# Patient Record
Sex: FEMALE | Race: WHITE
[De-identification: names, ages, dates, MRNs, and addresses within clinical notes are randomized per-mention and may not be internally consistent; named-entity substitution may affect disease eponyms.]

---

## 2018-10-30 ENCOUNTER — HOSPITAL ENCOUNTER (INPATIENT)
Dept: HOSPITAL 74 - JER | Age: 28
LOS: 7 days | Discharge: HOME HEALTH | DRG: 49 | End: 2018-11-06
Attending: FAMILY MEDICINE | Admitting: INTERNAL MEDICINE
Payer: COMMERCIAL

## 2018-10-30 VITALS — BODY MASS INDEX: 26.4 KG/M2

## 2018-10-30 DIAGNOSIS — R29.898: ICD-10-CM

## 2018-10-30 DIAGNOSIS — G37.3: ICD-10-CM

## 2018-10-30 DIAGNOSIS — M54.9: ICD-10-CM

## 2018-10-30 DIAGNOSIS — G61.0: Primary | ICD-10-CM

## 2018-10-30 DIAGNOSIS — R27.0: ICD-10-CM

## 2018-10-30 LAB
ALBUMIN SERPL-MCNC: 4.6 G/DL (ref 3.4–5)
ALP SERPL-CCNC: 66 U/L (ref 45–117)
ALT SERPL-CCNC: 23 U/L (ref 13–61)
ANION GAP SERPL CALC-SCNC: 8 MMOL/L (ref 8–16)
APPEARANCE UR: CLEAR
AST SERPL-CCNC: 12 U/L (ref 15–37)
BASOPHILS # BLD: 0.6 % (ref 0–2)
BILIRUB SERPL-MCNC: 0.2 MG/DL (ref 0.2–1)
BILIRUB UR STRIP.AUTO-MCNC: NEGATIVE MG/DL
BUN SERPL-MCNC: 14 MG/DL (ref 7–18)
CALCIUM SERPL-MCNC: 9.1 MG/DL (ref 8.5–10.1)
CHLORIDE SERPL-SCNC: 106 MMOL/L (ref 98–107)
CO2 SERPL-SCNC: 24 MMOL/L (ref 21–32)
COLOR UR: (no result)
CREAT SERPL-MCNC: 0.5 MG/DL (ref 0.55–1.3)
DEPRECATED RDW RBC AUTO: 15.2 % (ref 11.6–15.6)
EOSINOPHIL # BLD: 2.6 % (ref 0–4.5)
EPITH CASTS URNS QL MICRO: (no result) /HPF
GLUCOSE SERPL-MCNC: 82 MG/DL (ref 74–106)
HCT VFR BLD CALC: 38.3 % (ref 32.4–45.2)
HGB BLD-MCNC: 12.3 GM/DL (ref 10.7–15.3)
INR BLD: 1 (ref 0.83–1.09)
KETONES UR QL STRIP: (no result)
LEUKOCYTE ESTERASE UR QL STRIP.AUTO: (no result)
LYMPHOCYTES # BLD: 30.1 % (ref 8–40)
MCH RBC QN AUTO: 23 PG (ref 25.7–33.7)
MCHC RBC AUTO-ENTMCNC: 32.2 G/DL (ref 32–36)
MCV RBC: 71.4 FL (ref 80–96)
MONOCYTES # BLD AUTO: 5.1 % (ref 3.8–10.2)
MUCOUS THREADS URNS QL MICRO: (no result)
NEUTROPHILS # BLD: 61.6 % (ref 42.8–82.8)
NITRITE UR QL STRIP: NEGATIVE
PH UR: 5 [PH] (ref 5–8)
PLATELET # BLD AUTO: 312 K/MM3 (ref 134–434)
PMV BLD: 8.3 FL (ref 7.5–11.1)
POTASSIUM SERPLBLD-SCNC: 4.2 MMOL/L (ref 3.5–5.1)
PROT SERPL-MCNC: 8.2 G/DL (ref 6.4–8.2)
PROT UR QL STRIP: NEGATIVE
PROT UR QL STRIP: NEGATIVE
PT PNL PPP: 11.8 SEC (ref 9.7–13)
RBC # BLD AUTO: 5.36 M/MM3 (ref 3.6–5.2)
SODIUM SERPL-SCNC: 137 MMOL/L (ref 136–145)
SP GR UR: 1.02 (ref 1.01–1.03)
UROBILINOGEN UR STRIP-MCNC: NEGATIVE MG/DL (ref 0.2–1)
WBC # BLD AUTO: 6.9 K/MM3 (ref 4–10)

## 2018-10-30 NOTE — HP
CHIEF COMPLAINT: Lower Extremity Weakness, Frequent Falls





PCP: Dr. Alanis


Neurologist: Dr. Burgess





HISTORY OF PRESENT ILLNESS:


This is a  29 y/o young woman with no past medical history except Childbirth. 

Who presents to the ED with worsened lower extremity weakness, unsteady gait 

and multiple falls x 2 weeks ago worse today sent in by her neurologist for 

admission. Patient reports taking 3 flexeril tabs at one time for her lumbar 

pain, the next morning she noted having numbness and tingling to her feet and 

fell in her bathroom, because her legs felt weak and she could not hold herself 

up. She reports having frequent falls, using walls to help her gait. Patient 

report being seen at [facility] having MRI's of her head and lumbar then doing a 

f/u with Dr. Burgess. Patient denies numbness to her upper body or face. denies 

facial droop, slurred speech, dizziness, blurred vision.





ER course was notable for:


(1)


(2)


(3)





Recent Travel: None





PAST MEDICAL HISTORY:


See HPI





PAST SURGICAL HISTORY:


See HPI





Social History:


Smoking: Never


Alcohol:  None


Drugs:    None


Independent, employed as Pharmacy Tech





Family History:





Allergies





No Known Allergies Allergy (Unverified 10/30/18 19:27)


 








HOME MEDICATIONS:


REVIEW OF SYSTEMS


CONSTITUTIONAL: 


Absent:  fever, chills, diaphoresis, generalized weakness, malaise, loss of 

appetite, weight change


HEENT: 


Absent:  rhinorrhea, nasal congestion, throat pain, throat swelling, difficulty 

swallowing, mouth swelling, ear pain, eye pain, visual changes


CARDIOVASCULAR: 


Absent: chest pain, syncope, palpitations, irregular heart rate, lightheadedness

, peripheral edema


RESPIRATORY: 


Absent: cough, shortness of breath, dyspnea with exertion, orthopnea, wheezing, 

stridor, hemoptysis


GASTROINTESTINAL:


Absent: abdominal pain, abdominal distension, nausea, vomiting, diarrhea, 

constipation, melena, hematochezia


GENITOURINARY: 


Absent: dysuria, frequency, urgency, hesitancy, hematuria, flank pain, genital 

pain


MUSCULOSKELETAL: 


Absent: myalgia, arthralgia, joint swelling, back pain, neck pain


SKIN: 


Absent: rash, itching, pallor


HEMATOLOGIC/IMMUNOLOGIC: 


Absent: easy bleeding, easy bruising, lymphadenopathy, frequent infections


ENDOCRINE:


Absent: unexplained weight gain, unexplained weight loss, heat intolerance, 

cold intolerance


NEUROLOGIC: lower extremity weakness, unsteady gait


Absent: headache, focal weakness or paresthesias, dizziness, seizure, mental 

status changes, bladder or bowel incontinence


PSYCHIATRIC: 


Absent: anxiety, depression, suicidal or homicidal ideation, hallucinations.








PHYSICAL EXAMINATION


 Vital Signs - 24 hr











  10/30/18





  19:27


 


Temperature 98.6 F


 


Pulse Rate 82


 


Respiratory 18





Rate 


 


Blood Pressure 144/81


 


O2 Sat by Pulse 100





Oximetry (%) 











GENERAL: Awake, alert, and fully oriented, in no acute distress.


HEAD: Normal with no signs of trauma.


EYES: Pupils equal, round and reactive to light, extraocular movements intact, 

sclera anicteric, conjunctiva clear. No lid lag.


EARS, NOSE, THROAT: Ears normal, nares patent, oropharynx clear without 

exudates. Moist mucous membranes.


NECK: Normal range of motion, supple without lymphadenopathy, JVD, or masses.


LUNGS: Breath sounds equal, clear to auscultation bilaterally. No wheezes, and 

no crackles. No accessory muscle use.


HEART: Regular rate and rhythm, normal S1 and S2 without murmur, rub or gallop.


ABDOMEN: Soft, nontender, not distended, normoactive bowel sounds, no guarding, 

no rebound, no masses.  No hepatomegaly or  splenomegaly. 


MUSCULOSKELETAL: Normal range of motion at all joints. No bony deformities or 

tenderness. No CVA tenderness.


UPPER EXTREMITIES: 2+ pulses, warm, well-perfused. No cyanosis. No clubbing. No 

peripheral edema.


LOWER EXTREMITIES: 2+ pulses, warm, well-perfused. No calf tenderness. No 

peripheral edema. 


NEUROLOGICAL:  Cranial nerves II-XII intact. Normal speech. Ataxic gait


PSYCHIATRIC: Cooperative. Good eye contact. Appropriate mood and affect.


SKIN: Warm, dry, normal turgor, no rashes or lesions noted, normal capillary 

refill. 


 Laboratory Results - last 24 hr











  10/30/18 10/30/18 10/30/18





  19:46 19:46 19:46


 


WBC  6.9  


 


RBC  5.36 H  


 


Hgb  12.3  


 


Hct  38.3  


 


MCV  71.4 L  


 


MCH  23.0 L  


 


MCHC  32.2  


 


RDW  15.2  


 


Plt Count  312  


 


MPV  8.3  


 


Absolute Neuts (auto)  4.3  


 


Neutrophils %  61.6  


 


Lymphocytes %  30.1  


 


Monocytes %  5.1  


 


Eosinophils %  2.6  


 


Basophils %  0.6  


 


Nucleated RBC %  0  


 


PT with INR   11.80 


 


INR   1.00 


 


Sodium    137


 


Potassium    4.2


 


Chloride    106


 


Carbon Dioxide    24


 


Anion Gap    8


 


BUN    14


 


Creatinine    0.5 L


 


Creat Clearance w eGFR    > 60


 


Random Glucose    82


 


Calcium    9.1


 


Total Bilirubin    0.2


 


AST    12 L


 


ALT    23


 


Alkaline Phosphatase    66


 


Total Protein    8.2


 


Albumin    4.6


 


Serum Pregnancy, Qual   


 


Urine Color   


 


Urine Appearance   


 


Urine pH   


 


Ur Specific Gravity   


 


Urine Protein   


 


Urine Glucose (UA)   


 


Urine Ketones   


 


Urine Blood   


 


Urine Nitrite   


 


Urine Bilirubin   


 


Urine Urobilinogen   


 


Ur Leukocyte Esterase   


 


Urine WBC (Auto)   


 


Urine RBC (Auto)   


 


Ur Epithelial Cells   


 


Urine Mucus   














  10/30/18 10/30/18





  19:57 20:05


 


WBC  


 


RBC  


 


Hgb  


 


Hct  


 


MCV  


 


MCH  


 


MCHC  


 


RDW  


 


Plt Count  


 


MPV  


 


Absolute Neuts (auto)  


 


Neutrophils %  


 


Lymphocytes %  


 


Monocytes %  


 


Eosinophils %  


 


Basophils %  


 


Nucleated RBC %  


 


PT with INR  


 


INR  


 


Sodium  


 


Potassium  


 


Chloride  


 


Carbon Dioxide  


 


Anion Gap  


 


BUN  


 


Creatinine  


 


Creat Clearance w eGFR  


 


Random Glucose  


 


Calcium  


 


Total Bilirubin  


 


AST  


 


ALT  


 


Alkaline Phosphatase  


 


Total Protein  


 


Albumin  


 


Serum Pregnancy, Qual   Negative


 


Urine Color  Straw 


 


Urine Appearance  Clear 


 


Urine pH  5.0 


 


Ur Specific Gravity  1.017 


 


Urine Protein  Negative 


 


Urine Glucose (UA)  Negative 


 


Urine Ketones  1+ H 


 


Urine Blood  Negative 


 


Urine Nitrite  Negative 


 


Urine Bilirubin  Negative 


 


Urine Urobilinogen  Negative 


 


Ur Leukocyte Esterase  1+ H 


 


Urine WBC (Auto)  4 


 


Urine RBC (Auto)  1 


 


Ur Epithelial Cells  Rare 


 


Urine Mucus  Rare 











ASSESSMENT/PLAN:








Problem List





- Problem


(1) Guillain Barr syndrome


Code(s): G61.0 - GUILLAIN-BARRE SYNDROME   





(2) Lower extremity weakness


Code(s): R29.898 - OTH SYMPTOMS AND SIGNS INVOLVING THE MUSCULOSKELETAL SYSTEM 

  





(3) Ataxia of both legs


Code(s): R27.0 - ATAXIA, UNSPECIFIED   





(4) Back pain


Code(s): M54.9 - DORSALGIA, UNSPECIFIED   





(5) DVT prophylaxis


Code(s): [id number] -    





Visit type





- Emergency Visit


Emergency Visit: Yes


ED Registration Date: 10/30/18


Care time: The patient presented to the Emergency Department on the above date 

and was hospitalized for further evaluation of their emergent condition.





- New Patient


This patient is new to me today: Yes


Date on this admission: 10/30/18





- Critical Care


Critical Care patient: No

## 2018-10-30 NOTE — PDOC
History of Present Illness





- General


History Source: Patient


Exam Limitations: No Limitations





- History of Present Illness


Initial Comments: 





10/30/18 21:10


Patient is a 28 year old female with a past medical history of childbirth and 

familial HLD who presents to the emergency department for numbness to lower 

extremities and ataxia. Patient reports a 15 day history of worsening numbness 

to feet, which over time, ascended above her knees. She reports multiple falls 

since the start of her symptoms. Patient believed her symptoms were a result of 

the flexeril she is taking secondary to her back pain. She states she was sent 

to the ED for further evaluation by her neurologist Dr. Burgess. 





The patient denies chest pain, shortness of breath, headache, and dizziness. 

Denies fevers, chills, nausea, and vomiting. 





Allergies: No known allergies. 


Social History: No reported alcohol, cigarette, or drug use. 


Surgical History: None


PCP: Dr. Alanis


Neurologist: Dr. Burgess





<Shay Alcala - Last Filed: 10/30/18 21:18>





<Mercy Farias - Last Filed: 10/30/18 21:38>





- General


Chief Complaint: Head/Neck problem


Stated Complaint: ROGERIO FEET NUMBNESS,ATAXIA


Time Seen by Provider: 10/30/18 19:53





Past History





<Shay Alcala - Last Filed: 10/30/18 21:18>





- Past Medical History


COPD: No





- Suicide/Smoking/Psychosocial Hx


Smoking History: Never smoked





<Mercy Farias - Last Filed: 10/30/18 21:38>





- Past Medical History


Allergies/Adverse Reactions: 


 Allergies











Allergy/AdvReac Type Severity Reaction Status Date / Time


 


No Known Allergies Allergy   Unverified 10/30/18 19:27














**Review of Systems





- Review of Systems


Able to Perform ROS?: Yes


Comments:: 


CONSTITUTIONAL: 


Absent: fever, chills, diaphoresis, generalized weakness, malaise, loss of 

appetite


HEENT: 


Absent: rhinorrhea, nasal congestion, throat pain, throat swelling, difficulty 

swallowing, mouth swelling, ear pain, eye pain, visual Changes


CARDIOVASCULAR: 


Absent: chest pain, syncope, palpitations, irregular heart rate, lightheadedness

, peripheral edema


RESPIRATORY: 


Absent: cough, shortness of breath, dyspnea with exertion, orthopnea, wheezing, 

stridor, hemoptysis


GASTROINTESTINAL:


Absent: abdominal pain, abdominal distension, nausea, vomiting, diarrhea, 

constipation, melena, hematochezia


GENITOURINARY: 


Absent: dysuria, frequency, urgency, hesitancy, hematuria, flank pain, genital 

pain


MUSCULOSKELETAL: (+)back pain. 


Absent: myalgia, arthralgia, joint swelling


SKIN: 


Absent: rash, itching, pallor


HEMATOLOGIC/IMMUNOLOGIC: 


Absent: easy bleeding, easy bruising, lymphadenopathy, frequent infections


ENDOCRINE:


Absent: unexplained weight gain, unexplained weight loss, heat intolerance, 

cold intolerance


NEUROLOGIC: (+)ataxia. (+)unsteady gait. (+)numbness to lower extremities. 


Absent: headache, focal weakness or paresthesias, dizziness, seizure, mental 

status changes, bladder or bowel incontinence


PSYCHIATRIC: 


Absent: anxiety, depression, suicidal or homicidal ideation, hallucinations.








<Shay Alcala - Last Filed: 10/30/18 21:18>





*Physical Exam





- Vital Signs


 Last Vital Signs











Temp Pulse Resp BP Pulse Ox


 


 98.6 F   82   18   144/81   100 


 


 10/30/18 19:27  10/30/18 19:27  10/30/18 19:27  10/30/18 19:27  10/30/18 19:27














- Physical Exam


Comments: 


wnwd 29 y/o female in no acute distress


head ncat


neck supple. No jvd, no bruits


Heart RRR. No gallops, murmurs, or rubs. 


lungs clear to auscultation bilaterally 


abd soft,nontender. No CVA tenderness.


+Lower extremities pins and needles tingling numbness on feet up to side, 

ascending weakness from her toes to mid thighs bilaterally. Has reflexes. 


Upper motor strength, totally fine, no facial droop. ext no e/c/c, MAEx4


skin warm and dry,no rashes


neuro A&Ox3. +Foot drop with gait. Ataxia.








<Shay Alcala - Last Filed: 10/30/18 21:18>





- Vital Signs


 Last Vital Signs











Temp Pulse Resp BP Pulse Ox


 


 98.6 F   82   18   144/81   100 


 


 10/30/18 19:27  10/30/18 19:27  10/30/18 19:27  10/30/18 19:27  10/30/18 19:27














<Mercy Farias - Last Filed: 10/30/18 21:38>





ED Treatment Course





- LABORATORY


CBC & Chemistry Diagram: 


 10/30/18 19:46





 10/30/18 19:46





- ADDITIONAL ORDERS


Additional order review: 


 Laboratory  Results











  10/30/18 10/30/18 10/30/18





  20:05 19:57 19:46


 


PT with INR   


 


INR   


 


Sodium    137


 


Potassium    4.2


 


Chloride    106


 


Carbon Dioxide    24


 


Anion Gap    8


 


BUN    14


 


Creatinine    0.5 L


 


Creat Clearance w eGFR    > 60


 


Random Glucose    82


 


Calcium    9.1


 


Total Bilirubin    0.2


 


AST    12 L


 


ALT    23


 


Alkaline Phosphatase    66


 


Total Protein    8.2


 


Albumin    4.6


 


Serum Pregnancy, Qual  Negative  


 


Urine Color   Straw 


 


Urine Appearance   Clear 


 


Urine pH   5.0 


 


Ur Specific Gravity   1.017 


 


Urine Protein   Negative 


 


Urine Glucose (UA)   Negative 


 


Urine Ketones   1+ H 


 


Urine Blood   Negative 


 


Urine Nitrite   Negative 


 


Urine Bilirubin   Negative 


 


Urine Urobilinogen   Negative 


 


Ur Leukocyte Esterase   1+ H 


 


Urine WBC (Auto)   4 


 


Urine RBC (Auto)   1 


 


Ur Epithelial Cells   Rare 


 


Urine Mucus   Rare 














  10/30/18





  19:46


 


PT with INR  11.80


 


INR  1.00


 


Sodium 


 


Potassium 


 


Chloride 


 


Carbon Dioxide 


 


Anion Gap 


 


BUN 


 


Creatinine 


 


Creat Clearance w eGFR 


 


Random Glucose 


 


Calcium 


 


Total Bilirubin 


 


AST 


 


ALT 


 


Alkaline Phosphatase 


 


Total Protein 


 


Albumin 


 


Serum Pregnancy, Qual 


 


Urine Color 


 


Urine Appearance 


 


Urine pH 


 


Ur Specific Gravity 


 


Urine Protein 


 


Urine Glucose (UA) 


 


Urine Ketones 


 


Urine Blood 


 


Urine Nitrite 


 


Urine Bilirubin 


 


Urine Urobilinogen 


 


Ur Leukocyte Esterase 


 


Urine WBC (Auto) 


 


Urine RBC (Auto) 


 


Ur Epithelial Cells 


 


Urine Mucus 








 











  10/30/18





  19:46


 


RBC  5.36 H


 


MCV  71.4 L


 


MCHC  32.2


 


RDW  15.2


 


MPV  8.3


 


Neutrophils %  61.6


 


Lymphocytes %  30.1


 


Monocytes %  5.1


 


Eosinophils %  2.6


 


Basophils %  0.6














<Shay Alcala - Last Filed: 10/30/18 21:18>





- LABORATORY


CBC & Chemistry Diagram: 


 10/30/18 19:46





 10/30/18 19:46





- ADDITIONAL ORDERS


Additional order review: 


 Laboratory  Results











  10/30/18 10/30/18 10/30/18





  20:05 19:57 19:46


 


PT with INR   


 


INR   


 


Sodium    137


 


Potassium    4.2


 


Chloride    106


 


Carbon Dioxide    24


 


Anion Gap    8


 


BUN    14


 


Creatinine    0.5 L


 


Creat Clearance w eGFR    > 60


 


Random Glucose    82


 


Calcium    9.1


 


Total Bilirubin    0.2


 


AST    12 L


 


ALT    23


 


Alkaline Phosphatase    66


 


Total Protein    8.2


 


Albumin    4.6


 


Serum Pregnancy, Qual  Negative  


 


Urine Color   Straw 


 


Urine Appearance   Clear 


 


Urine pH   5.0 


 


Ur Specific Gravity   1.017 


 


Urine Protein   Negative 


 


Urine Glucose (UA)   Negative 


 


Urine Ketones   1+ H 


 


Urine Blood   Negative 


 


Urine Nitrite   Negative 


 


Urine Bilirubin   Negative 


 


Urine Urobilinogen   Negative 


 


Ur Leukocyte Esterase   1+ H 


 


Urine WBC (Auto)   4 


 


Urine RBC (Auto)   1 


 


Ur Epithelial Cells   Rare 


 


Urine Mucus   Rare 














  10/30/18





  19:46


 


PT with INR  11.80


 


INR  1.00


 


Sodium 


 


Potassium 


 


Chloride 


 


Carbon Dioxide 


 


Anion Gap 


 


BUN 


 


Creatinine 


 


Creat Clearance w eGFR 


 


Random Glucose 


 


Calcium 


 


Total Bilirubin 


 


AST 


 


ALT 


 


Alkaline Phosphatase 


 


Total Protein 


 


Albumin 


 


Serum Pregnancy, Qual 


 


Urine Color 


 


Urine Appearance 


 


Urine pH 


 


Ur Specific Gravity 


 


Urine Protein 


 


Urine Glucose (UA) 


 


Urine Ketones 


 


Urine Blood 


 


Urine Nitrite 


 


Urine Bilirubin 


 


Urine Urobilinogen 


 


Ur Leukocyte Esterase 


 


Urine WBC (Auto) 


 


Urine RBC (Auto) 


 


Ur Epithelial Cells 


 


Urine Mucus 








 











  10/30/18





  19:46


 


RBC  5.36 H


 


MCV  71.4 L


 


MCHC  32.2


 


RDW  15.2


 


MPV  8.3


 


Neutrophils %  61.6


 


Lymphocytes %  30.1


 


Monocytes %  5.1


 


Eosinophils %  2.6


 


Basophils %  0.6














<Mercy Farias - Last Filed: 10/30/18 21:38>





Medical Decision Making





- Medical Decision Making





10/30/18 21:02


28-year-old female was referred to the emergency department for admission by 

her neurologist,Dr BURGESS





.  Patient has had 15 days of increasing lower extremity weakness, pins and 

needle sensation that now reaches from her toes to her mid thigh.  She denies 

any shortness breath.  She noted over the past 10 days she's had problems 

falling and she went to [facility] yesterday and had a CAT scan of the head 

and then it was followed by an MRI of the brain and lumbar spine that was 

reported to be negative.  She was referred to the neurologist  and saw Dr Burgess 

today who brought her to the ER.


On exam, it was shown that she has decreased touch and proprioception to her 

legs from the mid thigh extending to her toes.  She has a peripheral flaccid  

sensory ataxia.  She does have bilateral ankle jerks.  She is being admitted 

for further care for  ascending peripheral neuropathy.  Currently she is 

resistant to lumbar puncture but DR Burgess said he will address this again 

tomorrow and discuss further treatment options with her


10/30/18 21:30





10/30/18 21:36








<Mercy Farias - Last Filed: 10/30/18 21:38>





*DC/Admit/Observation/Transfer





- Attestations


Scribe Attestion: 


Documentation prepared by Shay Alcala, acting as medical scribe for Mercy Farias MD.





<Shay Alcala - Last Filed: 10/30/18 21:18>





- Discharge Dispostion


Decision to Admit order: Yes





<Mercy Farias - Last Filed: 10/30/18 21:38>


Diagnosis at time of Disposition: 


 Guillain Barr syndrome








- Discharge Dispostion


Condition at time of disposition: Good





- Referrals


Referrals: 


Yunior Alanis MD [Primary Care Provider] - 





- Patient Instructions





- Post Discharge Activity

## 2018-10-31 LAB
ANION GAP SERPL CALC-SCNC: 6 MMOL/L (ref 8–16)
APPEARANCE CSF: CLEAR
BASOPHILS # BLD: 0.6 % (ref 0–2)
BUN SERPL-MCNC: 14 MG/DL (ref 7–18)
CALCIUM SERPL-MCNC: 8.7 MG/DL (ref 8.5–10.1)
CHLORIDE SERPL-SCNC: 109 MMOL/L (ref 98–107)
CO2 SERPL-SCNC: 25 MMOL/L (ref 21–32)
COLOR CSF: COLORLESS
CREAT SERPL-MCNC: 0.6 MG/DL (ref 0.55–1.3)
DEPRECATED RDW RBC AUTO: 15 % (ref 11.6–15.6)
EOSINOPHIL # BLD: 3.2 % (ref 0–4.5)
GLUCOSE CSF-MCNC: 64 MG/DL (ref 40–70)
GLUCOSE SERPL-MCNC: 97 MG/DL (ref 74–106)
HCT VFR BLD CALC: 36.4 % (ref 32.4–45.2)
HGB BLD-MCNC: 11.4 GM/DL (ref 10.7–15.3)
LYMPHOCYTES # BLD: 34.6 % (ref 8–40)
MCH RBC QN AUTO: 22.6 PG (ref 25.7–33.7)
MCHC RBC AUTO-ENTMCNC: 31.4 G/DL (ref 32–36)
MCV RBC: 71.9 FL (ref 80–96)
MONOCYTES # BLD AUTO: 6.5 % (ref 3.8–10.2)
NEUTROPHILS # BLD: 55.1 % (ref 42.8–82.8)
PLATELET # BLD AUTO: 252 K/MM3 (ref 134–434)
PMV BLD: 8.3 FL (ref 7.5–11.1)
POTASSIUM SERPLBLD-SCNC: 4 MMOL/L (ref 3.5–5.1)
RBC # BLD AUTO: 5.06 M/MM3 (ref 3.6–5.2)
SODIUM SERPL-SCNC: 141 MMOL/L (ref 136–145)
WBC # BLD AUTO: 5 K/MM3 (ref 4–10)
WBC # CSF: 7 /UL

## 2018-10-31 PROCEDURE — 009U3ZZ DRAINAGE OF SPINAL CANAL, PERCUTANEOUS APPROACH: ICD-10-PCS | Performed by: SPECIALIST

## 2018-10-31 PROCEDURE — B01BZZZ FLUOROSCOPY OF SPINAL CORD: ICD-10-PCS | Performed by: SPECIALIST

## 2018-10-31 RX ADMIN — DEXTROSE AND SODIUM CHLORIDE SCH MLS/HR: 5; 450 INJECTION, SOLUTION INTRAVENOUS at 21:32

## 2018-10-31 RX ADMIN — DEXTROSE AND SODIUM CHLORIDE SCH MLS/HR: 5; 450 INJECTION, SOLUTION INTRAVENOUS at 06:20

## 2018-10-31 NOTE — PN
Progress Note, Physician


Chief Complaint: 





SCHEDULED FOR LUMBAR TAP TO EVALUATE 


FOR GULLAN BURE VERSUS OTHER TYPES OF ATAXIA


EVENTS AND NOTES REVIEWED





- Current Medication List


Current Medications: 


Active Medications





Dextrose/Sodium Chloride (D5-1/2ns -)  1,000 mls @ 75 mls/hr IV ASDIR WILLIAM


   Last Admin: 10/31/18 06:20 Dose:  75 mls/hr











- Objective


Vital Signs: 


 Vital Signs











Temperature  98.1 F   10/31/18 07:41


 


Pulse Rate  80   10/31/18 07:41


 


Respiratory Rate  18   10/31/18 07:41


 


Blood Pressure  127/71   10/31/18 07:41


 


O2 Sat by Pulse Oximetry (%)  99   10/31/18 07:41











Constitutional: Yes: Mild Distress


Eyes: Yes: WNL


HENT: Yes: WNL


Neck: Yes: WNL


Cardiovascular: Yes: WNL


Respiratory: Yes: WNL


Gastrointestinal: Yes: WNL


Genitourinary: Yes: WNL


Musculoskeletal: Yes: Muscle Weakness


Extremities: Yes: WNL


Edema: No


Peripheral Pulses WNL: Yes


Integumentary: Yes: WNL


Wound/Incision: Yes: Clean/Dry


Neurological: Yes: Ataxia, Unsteady Gait, Weakness


...Motor Strength: LLE, RLE


Psychiatric: Yes: WNL


Labs: 


 CBC, BMP





 10/31/18 05:00 





 10/31/18 05:00 





 INR, PTT











INR  1.00  (0.83-1.09)   10/30/18  19:46    














Problem List





- Problems


(1) Ataxia of both legs


Code(s): R27.0 - ATAXIA, UNSPECIFIED   





(2) Back pain


Code(s): M54.9 - DORSALGIA, UNSPECIFIED   





(3) DVT prophylaxis


Code(s): [id number] -    





(4) Guillain Barr syndrome


Code(s): G61.0 - GUILLAIN-BARRE SYNDROME   





(5) Lower extremity weakness


Code(s): R29.898 - OTH SYMPTOMS AND SIGNS INVOLVING THE MUSCULOSKELETAL SYSTEM 

  





Assessment/Plan





CSF PROTEIN HIGH


WILL IVIG FOR ATAXIA TREATMENT


DVT PROPHYLAXIS


OOB TO CHAIR


NEUROLOGY AND PHYSICAL THERAPY FOLLOW UP


DR COONEY PHYSIATRY

## 2018-10-31 NOTE — EKG
Test Reason : 

Blood Pressure : ***/*** mmHG

Vent. Rate : 076 BPM     Atrial Rate : 076 BPM

   P-R Int : 184 ms          QRS Dur : 090 ms

    QT Int : 380 ms       P-R-T Axes : 022 018 027 degrees

   QTc Int : 427 ms

 

NORMAL SINUS RHYTHM

NORMAL ECG

NO PREVIOUS ECGS AVAILABLE

Confirmed by EKATERINA SHIRLEY, SAIGE ([id number]) on 10/31/2018 11:15:57 AM

 

Referred By:             Confirmed By:SAIGE TOBAR MD

## 2018-11-01 LAB
ALBUMIN SERPL-MCNC: 3.4 G/DL (ref 3.4–5)
ALP SERPL-CCNC: 51 U/L (ref 45–117)
ALT SERPL-CCNC: 18 U/L (ref 13–61)
ANION GAP SERPL CALC-SCNC: 7 MMOL/L (ref 8–16)
AST SERPL-CCNC: 10 U/L (ref 15–37)
BASOPHILS # BLD: 0.5 % (ref 0–2)
BILIRUB SERPL-MCNC: 0.3 MG/DL (ref 0.2–1)
BUN SERPL-MCNC: 9 MG/DL (ref 7–18)
CALCIUM SERPL-MCNC: 8.4 MG/DL (ref 8.5–10.1)
CHLORIDE SERPL-SCNC: 108 MMOL/L (ref 98–107)
CO2 SERPL-SCNC: 26 MMOL/L (ref 21–32)
CREAT SERPL-MCNC: 0.5 MG/DL (ref 0.55–1.3)
DEPRECATED RDW RBC AUTO: 15.6 % (ref 11.6–15.6)
EOSINOPHIL # BLD: 3.8 % (ref 0–4.5)
GLUCOSE SERPL-MCNC: 101 MG/DL (ref 74–106)
HCT VFR BLD CALC: 35.7 % (ref 32.4–45.2)
HGB BLD-MCNC: 11 GM/DL (ref 10.7–15.3)
LYMPHOCYTES # BLD: 38.2 % (ref 8–40)
MCH RBC QN AUTO: 22.1 PG (ref 25.7–33.7)
MCHC RBC AUTO-ENTMCNC: 30.8 G/DL (ref 32–36)
MCV RBC: 71.7 FL (ref 80–96)
MONOCYTES # BLD AUTO: 7.6 % (ref 3.8–10.2)
NEUTROPHILS # BLD: 49.9 % (ref 42.8–82.8)
PLATELET # BLD AUTO: 245 K/MM3 (ref 134–434)
PMV BLD: 8.5 FL (ref 7.5–11.1)
POTASSIUM SERPLBLD-SCNC: 4.3 MMOL/L (ref 3.5–5.1)
PROT SERPL-MCNC: 6.4 G/DL (ref 6.4–8.2)
RBC # BLD AUTO: 4.98 M/MM3 (ref 3.6–5.2)
SODIUM SERPL-SCNC: 141 MMOL/L (ref 136–145)
WBC # BLD AUTO: 4.1 K/MM3 (ref 4–10)

## 2018-11-01 RX ADMIN — DEXTROSE AND SODIUM CHLORIDE SCH MLS/HR: 5; 450 INJECTION, SOLUTION INTRAVENOUS at 13:48

## 2018-11-01 RX ADMIN — DIPHENHYDRAMINE HCL SCH MG: 25 CAPSULE ORAL at 18:03

## 2018-11-01 NOTE — CONSULT
Consultation: 


REQUESTING PROVIDER: Dr. Monteiro





CONSULT REQUEST: We have been asked to medically evaluate this patient for 

Guillain-Reseda syndrome. 





HISTORY OF PRESENT ILLNESS:


Patient is a 28 year old female with no past medical history presenting with 

worsening bilateral leg weakness, numbness and tingling since 2 weeks ago. 

Patient reports intermittent low back pain since 1 year ago for which she takes 

Naproxen as needed. Two weeks ago, she had severe 10/10 low back pain, for 

which she took 3 tablets of Flexeril at the same time. The next day, she 

reported weakness, numbness and tingling of both legs. Symptoms started 

worsening, and patient reports having frequent falls and needing to hold on to 

something to help herself up. When she's able to stand, her legs start to shake 

after a few minutes, and she eventually have to sit back down. Patient also 

reports numbness and tingling sensation of both legs, from mid-thigh down to 

the feet, R leg worse than the left, feet worse than the legs. Patient was 

reportedly seen at [facility] few days ago, where MRI of brain and lumbar 

spine done showed no abnormalities. With worsening symptoms, patient came to 

the ED. She denies any history of diarrhea or respiratory tract infection. 

Patient denies weakness or numbness of upper extremities, facial droop, blurred 

vision, headache or dizziness. She denies any SOB, chest pain, palpitations, 

abdominal pain, urinary symptoms. 





As per neuro, LP done yesterday revealed cyto-albumin dissociation with protein 

at 300 range and WBC of 7. 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 

progressing. Transferred to ICU for closer monitoring.  





REVIEW OF SYSTEMS:


CONSTITUTIONAL: 


Absent:  fever, chills, diaphoresis, generalized weakness, malaise, loss of 

appetite, weight change


HEENT: 


Absent:  rhinorrhea, nasal congestion, throat pain, throat swelling, difficulty 

swallowing, mouth swelling, ear pain, eye pain, visual changes


CARDIOVASCULAR: 


Absent: chest pain, syncope, palpitations, irregular heart rate, lightheadedness

, peripheral edema


RESPIRATORY: 


Absent: cough, shortness of breath, dyspnea with exertion, orthopnea, wheezing, 

stridor, hemoptysis


GASTROINTESTINAL:


Absent: abdominal pain, abdominal distension, nausea, vomiting, diarrhea, 

constipation, melena, hematochezia


GENITOURINARY: 


Absent: dysuria, frequency, urgency, hesitancy, hematuria, flank pain, genital 

pain


MUSCULOSKELETAL: 


Absent: myalgia, arthralgia, joint swelling, back pain, neck pain


SKIN: 


Absent: rash, itching, pallor


HEMATOLOGIC/IMMUNOLOGIC: 


Absent: easy bleeding, easy bruising, lymphadenopathy, frequent infections


ENDOCRINE:


Absent: unexplained weight gain, unexplained weight loss, heat intolerance, 

cold intolerance


NEUROLOGIC: unsteady gait; bilateral leg weakness, numbness and tingling


Absent: headache, dizziness, seizure, mental status changes, bladder or bowel 

incontinence


PSYCHIATRIC: 


Absent: anxiety, depression, suicidal or homicidal ideation, hallucinations.





PHYSICAL EXAMINATION





 Vital Signs - 24 hr











  10/31/18 11/01/18 11/01/18





  22:00 06:49 13:32


 


Temperature 98 F 97.9 F 98.4 F


 


Pulse Rate 98 H 82 85


 


Respiratory 20 20 17





Rate   


 


Blood Pressure 129/69 120/64 131/79














  11/01/18 11/01/18





  14:51 17:08


 


Temperature 98.3 F 98.6 F


 


Pulse Rate 91 H 96 H


 


Respiratory 18 20





Rate  


 


Blood Pressure 124/69 133/70














GENERAL: Awake, alert, and fully oriented, in no acute distress.


HEAD: Normal with no signs of trauma.


EYES: PERRLA, EOMI, sclera anicteric, conjunctiva clear.


EARS, NOSE, THROAT: Ears normal, nares patent, oropharynx clear without 

exudates. Moist mucous membranes.


NECK: Normal range of motion, supple without lymphadenopathy, JVD, or masses.


LUNGS: Breath sounds equal, clear to auscultation bilaterally. No wheezes, and 

no crackles. No accessory muscle use.


HEART: Regular rate and rhythm, normal S1 and S2 without murmur, rub or gallop.


ABDOMEN: Soft, nontender, not distended, normoactive bowel sounds.


MUSCULOSKELETAL: Normal range of motion at all joints. No bony deformities or 

tenderness. 


UPPER EXTREMITIES: 2+ pulses, warm, well-perfused. No cyanosis. No clubbing. 

Cap refill <2 seconds. No peripheral edema.


LOWER EXTREMITIES: 2+ pulses, warm, well-perfused. No calf tenderness. No 

peripheral edema. 


NEUROLOGICAL:  Cranial nerves II-XII intact. Normal speech. Gait not observed. B

/L LE: sensation diminished R>L; RLE: motor 5/5 on hip/knee flexion/extension, 

dorsiflexion and plantar flexion; LLE: motor 5/5 on hip/knee flexion/extension 

and 4/5 on dorsiflexion and plantar flexion. DTRs +2


PSYCHIATRIC: Cooperative. Good eye contact. Appropriate mood and affect.


SKIN: Warm, dry, normal turgor, no rashes or lesions noted. 











 Laboratory Results - last 24 hr











  11/01/18 11/01/18





  08:15 08:15


 


WBC  4.1 


 


RBC  4.98 


 


Hgb  11.0 


 


Hct  35.7 


 


MCV  71.7 L 


 


MCH  22.1 L 


 


MCHC  30.8 L 


 


RDW  15.6 


 


Plt Count  245 


 


MPV  8.5 


 


Absolute Neuts (auto)  2.1 


 


Neutrophils %  49.9 


 


Lymphocytes %  38.2 


 


Monocytes %  7.6 


 


Eosinophils %  3.8 


 


Basophils %  0.5 


 


Nucleated RBC %  0 


 


Sodium   141


 


Potassium   4.3


 


Chloride   108 H


 


Carbon Dioxide   26


 


Anion Gap   7 L


 


BUN   9


 


Creatinine   0.5 L


 


Creat Clearance w eGFR   > 60


 


Random Glucose   101


 


Calcium   8.4 L


 


Total Bilirubin   0.3


 


AST   10 L


 


ALT   18


 


Alkaline Phosphatase   51


 


Total Protein   6.4


 


Albumin   3.4








Active Medications











Generic Name Dose Route Start Last Admin





  Trade Name Freq  PRN Reason Stop Dose Admin


 


Acetaminophen  650 mg  11/01/18 05:10  





  Tylenol -  PO   





  Q6H PRN   





  PAIN OR FEVER   





     





     





     


 


Diphenhydramine HCl  25 mg  11/01/18 15:30  





  Benadryl -  PO  11/05/18 15:31  





  Q24H [facility]   





     





     





     





     


 


Dextrose/Sodium Chloride  1,000 mls @ 75 mls/hr  10/31/18 05:45  11/01/18 13:48





  D5-1/2ns -  IV   75 mls/hr





  ASDIR WILLIAM   Administration





     





     





     





     


 


Immune Globulin  25 gm in 250 mls @ 38.102 mls/hr  11/01/18 15:45  





  Gamunex-C  IVPB  11/05/18 22:19  





  Q24H WILLIAM   





     





     





  Protocol   





  1 MG/KG/MIN   











ASSESSMENT/PLAN:


Patient is a 28 year old female with no past medical history presenting with 

worsening bilateral leg weakness, numbness and tingling since 2 weeks ago. LP 

done yesterday revealed cyto-albumin dissociation with protein at 300 range and 

WBC of 7. 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 

Transferred to ICU for closer monitoring.  





#Neurology


1) Ascending bilateral leg weakness, likely 2/2 Guillain-Reseda Syndrome


-LP done revealed cyto-albumin dissociation with protein at 300 range and WBC 

of 7


-Neurology (Dr. Burgess) consulted. Recommendations appreciated.


-To start IVIG treatment


-For closer monitoring in the ICU


-IgA immunoglobulin ordered.


-H. pylori IgM, IgA, IgG Ab


-Neuro checks q4h


-Respiratory - FVC q4h


-Physical therapy.


-Pulmonology consulted. 





#FEN


-IV D5-1/2NS @75ml/hr


-electrolytes wnl, routine bmp monitoring


-regular diet





#Prophylaxis


-SCDs, both legs


-Early ambulation





#Disposition


-transfer to ICU for closer monitoring


-full code





Dispo: We will continue to follow the patient. Thank you for this consultative 

opportunity.

## 2018-11-01 NOTE — PN
Progress Note, Physician


Chief Complaint: 


tingling in legs





History of Present Illness: 


about a week of back pain, more recently tingling in legs, admitted from 

community after being seen in [facility] ER.  Initially minimal motor 

involvement, some facial weakness, though patient has noticed left leg getting 

week.  Admitted with DDX of AIDP vs. Transverse myelitis.  LP done under fluoro 

yesterday yielded cyto-albumin dissociation with protein in the 300 range and 

WBC of 7.











- Current Medication List


Current Medications: 


Active Medications





Acetaminophen (Tylenol -)  650 mg PO Q6H PRN


   PRN Reason: PAIN OR FEVER


Dextrose/Sodium Chloride (D5-1/2ns -)  1,000 mls @ 75 mls/hr IV ASDIR WILLIAM


   Last Admin: 11/01/18 13:48 Dose:  75 mls/hr











- Objective


Vital Signs: 


 Vital Signs











Temperature  98.3 F   11/01/18 14:51


 


Pulse Rate  91 H  11/01/18 14:51


 


Respiratory Rate  18   11/01/18 14:51


 


Blood Pressure  124/69   11/01/18 14:51


 


O2 Sat by Pulse Oximetry (%)  99   10/31/18 07:41











Neurological: Yes: Alert, Oriented, Cran Nerves II-XII Intact (except slight 

left facial weakness), Numbness (legs below knees), Tingling


...Motor Strength: LUE (5/5), LLE (4/5 dorsiflexion, plantarflexion, quads, 

hamstrings, iliopsoas), RUE (5/5), RLE (5-/5 dorsiflexion, 5/5 plantarflexion, 5

-/5 hamstring, 5-/5 iliopsoas, 5-/5 quads)


Additional Findings/Remarks: 





DTR's 2/4 throughout except for absent left knee jerk.  


Labs: 


 CBC, BMP





 11/01/18 08:15 





 11/01/18 08:15 





 INR, PTT











INR  1.00  (0.83-1.09)   10/30/18  19:46    














Problem List





- Problems


(1) Ataxia of both legs


Code(s): R27.0 - ATAXIA, UNSPECIFIED   





(2) Back pain


Code(s): M54.9 - DORSALGIA, UNSPECIFIED   





(3) DVT prophylaxis


Code(s): [id number] -    





(4) Guillain Barr syndrome


Code(s): G61.0 - GUILLAIN-BARRE SYNDROME   





(5) Lower extremity weakness


Code(s): R29.898 - OTH SYMPTOMS AND SIGNS INVOLVING THE MUSCULOSKELETAL SYSTEM 

  





Assessment/Plan


This is now more clearly Guaillan Callahan syndrome, still early but progressing.  

Will get respiratory involved and request transfer to ICU for closer monitoring

, though she is still for the most part strong.  Start IVIG.

## 2018-11-01 NOTE — PN
Progress Note, Physician


Chief Complaint: 





TRANSFERRING TO ICU FOR IMMUNOGLOBULIN THERAPY


AWAKE ALERT


STILL WITH ASCENDING LEG WEAKNESS





- Current Medication List


Current Medications: 


Active Medications





Acetaminophen (Tylenol -)  650 mg PO Q6H PRN


   PRN Reason: PAIN OR FEVER


Dextrose/Sodium Chloride (D5-1/2ns -)  1,000 mls @ 75 mls/hr IV ASDIR WILLIAM


   Last Admin: 10/31/18 21:32 Dose:  75 mls/hr











- Objective


Vital Signs: 


 Vital Signs











Temperature  97.9 F   11/01/18 06:49


 


Pulse Rate  82   11/01/18 06:49


 


Respiratory Rate  20   11/01/18 06:49


 


Blood Pressure  120/64   11/01/18 06:49


 


O2 Sat by Pulse Oximetry (%)  99   10/31/18 07:41











Constitutional: Yes: Mild Distress


Eyes: Yes: WNL


HENT: Yes: WNL


Neck: Yes: WNL


Cardiovascular: Yes: WNL


Respiratory: Yes: WNL


Gastrointestinal: Yes: WNL


Musculoskeletal: Yes: Muscle Weakness


Extremities: Yes: Other


Edema: No


Peripheral Pulses WNL: Yes


Integumentary: Yes: WNL


Wound/Incision: Yes: Clean/Dry


Neurological: Yes: Loss of Sensation, Numbness, Weakness, Other


...Motor Strength: LLE, RLE


Psychiatric: Yes: WNL


Labs: 


 CBC, BMP





 11/01/18 08:15 





 11/01/18 08:15 





 INR, PTT











INR  1.00  (0.83-1.09)   10/30/18  19:46    














Problem List





- Problems


(1) Ataxia of both legs


Code(s): R27.0 - ATAXIA, UNSPECIFIED   





(2) Back pain


Code(s): M54.9 - DORSALGIA, UNSPECIFIED   





(3) DVT prophylaxis


Code(s): [id number] -    





(4) Guillain Barr syndrome


Code(s): G61.0 - GUILLAIN-BARRE SYNDROME   





(5) Lower extremity weakness


Code(s): R29.898 - OTH SYMPTOMS AND SIGNS INVOLVING THE MUSCULOSKELETAL SYSTEM 

  





Assessment/Plan





CSF PROTEIN HIGH


WILLSTART  IVIG FOR ATAXIA TREATMENT


DVT PROPHYLAXIS


OOB TO CHAIR


NEUROLOGY AND PHYSICAL THERAPY FOLLOW UP


DR COONEY PHYSIATRY CONSULT  APPRECIATED


SNF

## 2018-11-01 NOTE — CONS
DATE OF CONSULTATION:  11/01/2018

 

REFERRING PHYSICIAN:  Yunior Alanis MD 

 

HISTORY OF PRESENT ILLNESS:  The patient is a 28-year-old woman with a past medical

history of chronic, intermittent back pain treated with cyclobenzaprine in the past

who presented with numbness in the right more than left upper limb, difficulty with

balance, gait, and weakness.  The patient had taken some cyclobenzaprine the night

before, developed weakness, unsteady gait, and multiple falls.  Was seen by Neurology

and referred for lumbar puncture as well as possible IVIG.  The patient did undergo

lumbar puncture, which showed only 7 WBCs, 0 RBCs, elevated total protein at 335, and

normal glucose 64.  The patient, otherwise, had blood work, which demonstrated normal

WBCs 6.9, hemoglobin 12.3, platelet count 312.  Repeat done this morning was stable,

WBC 4.1, hemoglobin 11, platelet count 245.  Chemistry done today was stable with

slight elevated chloride at 108, normal BUN 9, creatinine 0.5, TSH normal 2.11,

albumin normal on admission 4.6.  The patient was seen by physical therapy and states

she was able to ambulate.  There were no notations available yet from the therapist. 

The patient notes slight improvement in tingling but persistent loss of balance.  No

numbness or tingling in the upper extremities.  No bowel or bladder incontinence.  No

blurry vision or double vision.  

 

PAST MEDICAL HISTORY:  She states she has been seen in the emergency room multiple

times for a back condition and has been treated with cyclobenzaprine but no formal

physical therapy or treatment.  Otherwise, no past medical history.

 

SOCIAL HISTORY:  She lives in an apartment with 20 steps to enter.  Premorbidly

completely independent.  Current function, again, unsteady possibly requiring assist.

 

REVIEW OF SYSTEMS:  No headache, no lightheadedness or dizziness.  No blurry vision,

double vision, change in vision.  No nausea, vomiting, difficulty swallowing,

difficulty chewing.  No chest pain, shortness of breath, dyspnea on exertion, cough,

or abdominal discomfort.  No bowel or bladder incontinence, retention.  No diarrhea. 

She has, again, numbness and tingling in the right more than left lower extremity

with weakness in both lower extremities, some degree of back pain in the midline but

no radicular complaints.  No numbness, tingling, or weakness in the upper

extremities.  Some loss of balance.  No weight loss or weight gain.  No fever or

chills.  

 

PHYSICAL EXAMINATION: 

General:  The patient is a well-developed woman seen lying down in bed in no acute

distress.  She is awake and alert.  Seems to have good insight into her medical

conditions.  She is responsive to all directions.

HEENT:  She is normocephalic and atraumatic.  Extraocular muscles appear intact.  She

has no obvious facial weakness.

Neck:  Supple.

Extremities:  Without any pitting edema or calf tenderness.

Skin:  Without any rash or breakdown.

Neuromuscular:  She is awake, alert, oriented x3.  Cranial nerves 2-12 are grossly

intact.  Good motor power throughout the upper extremities with depressed but

symmetric reflexes.  Normal sensation to pinprick and good finger to nose.  In the

lower extremities she has dorsiflexor weakness bilaterally only 3/5 with better

plantar flexion at least 4/5.  Good knee flexion and extension.  Good hip girdle

strength.  Normal sensation to pinprick but absent reflexes in the lower extremities.

 Unsteady balance standing.

 

OVERALL IMPRESSION: 

1.  Deficits in mobility, activities of daily living, multifactorial.

2.  Numbness and tingling in the right lower extremity with weakness.  Elevated

protein on lumbar puncture with normal glucose consistent with Guillain-Howell

syndrome.

3.  Chronic back pain.

4.  Bilateral foot drop, partial.

 

PLAN/SUGGESTION:  

1.  Physical therapy to continue.

2.  Neurologic follow up.

3.  Agree with IVIG.

4.  Consider AFOs as an outpatient if weakness persists.

5.  May require inpatient rehabilitation prior to discharge to home depending on her

response to therapy.  She does have 20 stairs at home.

6.  DVT prophylaxis until more mobile.  Would consider subcutaneous heparin.

7.  Bowel regimen.  Monitor for constipation.

8.  Skin precautions.  

 

We will follow.  Thank you for this referral.

 

 

 

NELSON COONEY M.D.

 

TERESITA/[id number]

DD: 11/01/2018 12:47

DT: 11/01/2018 13:02

Job #:  [id number]

## 2018-11-02 LAB
ANION GAP SERPL CALC-SCNC: 8 MMOL/L (ref 8–16)
BUN SERPL-MCNC: 9 MG/DL (ref 7–18)
CALCIUM SERPL-MCNC: 9 MG/DL (ref 8.5–10.1)
CHLORIDE SERPL-SCNC: 106 MMOL/L (ref 98–107)
CO2 SERPL-SCNC: 23 MMOL/L (ref 21–32)
CREAT SERPL-MCNC: 0.6 MG/DL (ref 0.55–1.3)
DEPRECATED RDW RBC AUTO: 15.4 % (ref 11.6–15.6)
GLUCOSE SERPL-MCNC: 119 MG/DL (ref 74–106)
HCT VFR BLD CALC: 38.7 % (ref 32.4–45.2)
HGB BLD-MCNC: 12 GM/DL (ref 10.7–15.3)
MAGNESIUM SERPL-MCNC: 2.1 MG/DL (ref 1.8–2.4)
MCH RBC QN AUTO: 22.3 PG (ref 25.7–33.7)
MCHC RBC AUTO-ENTMCNC: 30.9 G/DL (ref 32–36)
MCV RBC: 72.1 FL (ref 80–96)
PHOSPHATE SERPL-MCNC: 2.3 MG/DL (ref 2.5–4.9)
PLATELET # BLD AUTO: 255 K/MM3 (ref 134–434)
PMV BLD: 8.3 FL (ref 7.5–11.1)
POTASSIUM SERPLBLD-SCNC: 4.3 MMOL/L (ref 3.5–5.1)
RBC # BLD AUTO: 5.36 M/MM3 (ref 3.6–5.2)
SODIUM SERPL-SCNC: 137 MMOL/L (ref 136–145)
WBC # BLD AUTO: 5.4 K/MM3 (ref 4–10)

## 2018-11-02 RX ADMIN — DEXTROSE AND SODIUM CHLORIDE SCH MLS/HR: 5; 450 INJECTION, SOLUTION INTRAVENOUS at 08:30

## 2018-11-02 RX ADMIN — DIPHENHYDRAMINE HCL SCH: 25 CAPSULE ORAL at 17:26

## 2018-11-02 RX ADMIN — DIPHENHYDRAMINE HCL SCH MG: 25 CAPSULE ORAL at 21:25

## 2018-11-02 RX ADMIN — POLYETHYLENE GLYCOL 3350 SCH: 17 POWDER, FOR SOLUTION ORAL at 17:21

## 2018-11-02 RX ADMIN — IMMUNE GLOBULIN (HUMAN) SCH MLS/HR: 10 INJECTION INTRAVENOUS; SUBCUTANEOUS at 21:28

## 2018-11-02 RX ADMIN — ACETAMINOPHEN PRN MG: 325 TABLET ORAL at 08:29

## 2018-11-02 NOTE — PN
Teaching Attending Note


Name of Resident: Carole Wright





ATTENDING PHYSICIAN STATEMENT





I saw and evaluated the patient.


I reviewed the resident's note and discussed the case with the resident.


I agree with the resident's findings and plan as documented.








SUBJECTIVE:


Patient seen and examined in the ICU.  Awake and alert.  Received first dose of 

IVIG early AM.  Felt some weakness and slightly unsteady when she ambulated to 

the bathroom. 





VC: 3.4 Liters (stable) since admission. 





No CP or SOB. 





Seen by Neuro this AM and exam appears stable. 





 Intake & Output











 10/30/18 10/31/18 11/01/18 11/02/18





 23:59 23:59 23:59 23:59


 


Intake Total  1600 2565 910


 


Balance  1600 2565 910


 


Weight 140 lb 140 lb 140 lb 








 Last Vital Signs











Temp Pulse Resp BP Pulse Ox


 


 98.1 F   76   18   132/80   98 


 


 11/02/18 10:00  11/02/18 12:06  11/02/18 12:06  11/02/18 12:06  11/02/18 09:00








Active Medications





Acetaminophen (Tylenol -)  650 mg PO Q6H PRN


   PRN Reason: PAIN OR FEVER


   Last Admin: 11/02/18 08:29 Dose:  650 mg


Diphenhydramine HCl (Benadryl -)  25 mg PO Q24H WILLIAM


   Stop: 11/05/18 15:31


   Last Admin: 11/01/18 18:03 Dose:  25 mg


Dextrose/Sodium Chloride (D5-1/2ns -)  1,000 mls @ 75 mls/hr IV ASDIR WILLIAM


   Last Admin: 11/02/18 08:30 Dose:  75 mls/hr


Immune Globulin (Gamunex-C)  25 gm in 250 mls @ 38.102 mls/hr IVPB Q24H WILLIAM; 

Protocol


   Stop: 11/06/18 04:34


Ibuprofen (Caldolor Injection -)  600 mg IVPB Q8H PRN


   PRN Reason: FEVER


   Last Admin: 11/02/18 10:02 Dose:  600 mg











GENERAL: Awake, alert, and fully oriented, in no acute distress.


HEAD: Normal with no signs of trauma.


EYES: sclera anicteric, conjunctiva clear.


EARS, NOSE, THROAT: Ears normal, nares patent, oropharynx clear without 

exudates. Moist mucous membranes.


NECK: Normal range of motion, supple without lymphadenopathy, JVD, or masses.


LUNGS: Breath sounds equal, clear to auscultation bilaterally. No wheezes, and 

no crackles. No accessory muscle use.


HEART: Regular rate and rhythm, normal S1 and S2 without murmur, rub or gallop.


ABDOMEN: Soft, nontender, not distended, normoactive bowel sounds.


MUSCULOSKELETAL: Normal range of motion at all joints. No bony deformities or 

tenderness. 


UPPER EXTREMITIES: 2+ pulses, warm, well-perfused. No cyanosis. No clubbing. 

Cap refill <2 seconds. No peripheral edema.


LOWER EXTREMITIES: 2+ pulses, warm, well-perfused. No calf tenderness. No 

peripheral edema. 


NEUROLOGICAL: Non-focal, sensation slightly diminished R>L; Motor appears 

intact 


PSYCHIATRIC: Cooperative. Good eye contact. Appropriate mood and affect.


SKIN: Warm, dry, normal turgor, no rashes or lesions noted. 











  


 Laboratory Results - last 24 hr











  11/02/18 11/02/18 11/02/18





  05:30 05:30 05:30


 


WBC  5.4  


 


RBC  5.36 H  


 


Hgb  12.0  


 


Hct  38.7  


 


MCV  72.1 L  


 


MCH  22.3 L  


 


MCHC  30.9 L  


 


RDW  15.4  


 


Plt Count  255  


 


MPV  8.3  


 


Sodium   137 


 


Potassium   4.3 


 


Chloride   106 


 


Carbon Dioxide   23 


 


Anion Gap   8 


 


BUN   9 


 


Creatinine   0.6 


 


Creat Clearance w eGFR   > 60 


 


Random Glucose   119 H 


 


Calcium   9.0 


 


Phosphorus   2.3 L 


 


Magnesium   2.1 


 


Crossmatch    See Detail











ASSESSMENT/PLAN:


Guillain-[place] syndrome (appears early but progressing symptoms) 


Questionable history of organophosphate exposure: no anticholinergic symptoms 

noted 








IVIG daily infusion 


Monitor FVC (stable at 3.4 L today) 


Monitor for signs of dysautonomia 


O2 as needed


Fall precautions 


Neuro checks 


ICU monitoring for Respiratory/Neuro monitoring 








Dr Kauffman 


Critical care time spent in reviewing chart, evaluating patient and formulating 

plan - 36 minutes.

## 2018-11-02 NOTE — PN
Progress Note, Physician


Chief Complaint: 





AWAKE ALERT


IN ICU


WEAKNESS CONTINUES IN LEGS





- Current Medication List


Current Medications: 


Active Medications





Acetaminophen (Tylenol -)  650 mg PO Q6H PRN


   PRN Reason: PAIN OR FEVER


   Last Admin: 11/02/18 08:29 Dose:  650 mg


Diphenhydramine HCl (Benadryl -)  25 mg PO Q24H WILLIAM


   Stop: 11/05/18 15:31


   Last Admin: 11/01/18 18:03 Dose:  25 mg


Dextrose/Sodium Chloride (D5-1/2ns -)  1,000 mls @ 75 mls/hr IV ASDIR WILLIAM


   Last Admin: 11/02/18 08:30 Dose:  75 mls/hr


Immune Globulin (Gamunex-C)  25 gm in 250 mls @ 38.102 mls/hr IVPB Q24H WILLIAM; 

Protocol


   Stop: 11/06/18 04:34











- Objective


Vital Signs: 


 Vital Signs











Temperature  98.4 F   11/02/18 05:21


 


Pulse Rate  74   11/02/18 08:24


 


Respiratory Rate  18   11/02/18 08:24


 


Blood Pressure  128/83   11/02/18 08:24


 


O2 Sat by Pulse Oximetry (%)  98   11/02/18 08:00











Constitutional: Yes: Mild Distress


Eyes: Yes: WNL


HENT: Yes: WNL


Neck: Yes: WNL


Cardiovascular: Yes: WNL


Respiratory: Yes: WNL


Gastrointestinal: Yes: WNL


Genitourinary: Yes: WNL


Musculoskeletal: Yes: Muscle Weakness


Extremities: Yes: Other


Edema: No


Peripheral Pulses WNL: Yes


Integumentary: Yes: WNL


Wound/Incision: Yes: Clean/Dry


Neurological: Yes: Weakness


...Motor Strength: LLE, RLE


Psychiatric: Yes: WNL


Labs: 


 CBC, BMP





 11/02/18 05:30 





 11/02/18 05:30 





 INR, PTT











INR  1.00  (0.83-1.09)   10/30/18  19:46    














Problem List





- Problems


(1) Ataxia of both legs


Code(s): R27.0 - ATAXIA, UNSPECIFIED   





(2) Back pain


Code(s): M54.9 - DORSALGIA, UNSPECIFIED   





(3) DVT prophylaxis


Code(s): [id number] -    





(4) Guillain Barr syndrome


Code(s): G61.0 - GUILLAIN-BARRE SYNDROME   





(5) Lower extremity weakness


Code(s): R29.898 - OTH SYMPTOMS AND SIGNS INVOLVING THE MUSCULOSKELETAL SYSTEM 

  





Assessment/Plan





QUESTIONABLE ORGANOPHASPHATE EXPOSURE HOWEVER


THERE ARE NO OTHER ACYTLLCHOLINE D/O


CONTINUE TREATMENT FOR GB


ON IVIG


NEURO EVAL APPRECIATED


DVT PROPHYLAXIS


PT/SNF

## 2018-11-02 NOTE — PN
Progress Note, Physician


Chief Complaint: 


tingling in legs





History of Present Illness: 


about a week of back pain, more recently tingling in legs, admitted from 

community after being seen in [facility] ER.  Initially minimal motor 

involvement, some facial weakness, though patient has noticed left leg getting 

week.  Admitted with DDX of AIDP vs. Transverse myelitis.  LP done under fluoro 

yesterday yielded cyto-albumin dissociation with protein in the 300 range and 

WBC of 7.











- Current Medication List


Current Medications: 


Active Medications





Acetaminophen (Tylenol -)  650 mg PO Q6H PRN


   PRN Reason: PAIN OR FEVER


   Last Admin: 11/02/18 08:29 Dose:  650 mg


Diphenhydramine HCl (Benadryl -)  25 mg PO Q24H [facility]


   Stop: 11/05/18 15:31


   Last Admin: 11/01/18 18:03 Dose:  25 mg


Dextrose/Sodium Chloride (D5-1/2ns -)  1,000 mls @ 75 mls/hr IV ASDIR [facility]IAM


   Last Admin: 11/02/18 08:30 Dose:  75 mls/hr


Immune Globulin (Gamunex-C)  25 gm in 250 mls @ 38.102 mls/hr IVPB Q24H WILLIAM; 

Protocol


   Stop: 11/06/18 04:34


Ibuprofen (Caldolor Injection -)  600 mg IVPB Q8H PRN


   PRN Reason: FEVER


   Last Admin: 11/02/18 10:02 Dose:  600 mg











- Objective


Vital Signs: 


 Vital Signs











Temperature  98.1 F   11/02/18 10:00


 


Pulse Rate  74   11/02/18 10:00


 


Respiratory Rate  18   11/02/18 10:00


 


Blood Pressure  134/81   11/02/18 10:00


 


O2 Sat by Pulse Oximetry (%)  98   11/02/18 08:00











Constitutional: Yes: Well Nourished, No Distress, Calm


Neurological: Yes: Alert, Oriented, Facial Droop (very subtle on left), Loss of 

Sensation (legs), Tingling (legs)


...Motor Strength: LUE (5/5), LLE (3/5 ileopsoas, 3+/5 quad, 3+/5 hamstring, 4/

5 plantarflexion, 0/5 dorsiflexion), RUE (5/5), RLE (4/5 ileopsoas, 4/5 quad, 3+

/5 hamstring, 0/5 dorsiflexion 4+/5 plantarflexion)


Additional Findings/Remarks: 


DTR's 1/4 at patellar's bilaterally, otherwise 2/4 throughout (including 

Achilles)





Labs: 


 CBC, BMP





 11/02/18 05:30 





 11/02/18 05:30 





 INR, PTT











INR  1.00  (0.83-1.09)   10/30/18  19:46    














Problem List





- Problems


(1) Ataxia of both legs


Code(s): R27.0 - ATAXIA, UNSPECIFIED   





(2) Back pain


Code(s): M54.9 - DORSALGIA, UNSPECIFIED   





(3) DVT prophylaxis


Code(s): UBN3760 -    





(4) Guillain Barr syndrome


Code(s): G61.0 - GUILLAIN-BARRE SYNDROME   





(5) Lower extremity weakness


Code(s): R29.898 - OTH SYMPTOMS AND SIGNS INVOLVING THE MUSCULOSKELETAL SYSTEM 

  





Assessment/Plan


This is now more clearly Guillan [place] syndrome, still early but progressing.  

Although her legs are progressively weaker, her respiratory status is stable as 

is her upper body strength in general.  she tolerated her first dose of IVIG 

well and is in ICU for monitoring as risk of respiratory decline is there.  

Continue IVIG, 25 gms daily x 5 days, 4 more doses.

## 2018-11-02 NOTE — PN
Physical Exam: 


SUBJECTIVE: Patient seen and examined at bedside. Patient reports more weakness 

of both legs this morning as she was walking to the bathroom. She also notes 

persistent numbness and tingling of both legs, R worse than the L. Denies SOB, 

palpitations, abdominal pain, diarrhea, urinary symptoms. 








OBJECTIVE:





 Vital Signs











 Period  Temp  Pulse  Resp  BP Sys/Wu  Pulse Ox


 


 Last 24 Hr  98.1 F-98.6 F  74-96  14-20  107-134/61-83  98-98








GENERAL: Awake, alert, and fully oriented, in no acute distress.


HEAD: Normal with no signs of trauma.


EYES: PERRLA, EOMI, sclera anicteric, conjunctiva clear.


EARS, NOSE, THROAT: Ears normal, nares patent, oropharynx clear without 

exudates. Moist mucous membranes.


NECK: Normal range of motion, supple without lymphadenopathy, JVD, or masses.


LUNGS: Breath sounds equal, clear to auscultation bilaterally. No wheezes, and 

no crackles. No accessory muscle use.


HEART: Regular rate and rhythm, normal S1 and S2 without murmur, rub or gallop.


ABDOMEN: Soft, nontender, not distended, normoactive bowel sounds.


MUSCULOSKELETAL: Normal range of motion at all joints. No bony deformities or 

tenderness. 


UPPER EXTREMITIES: 2+ pulses, warm, well-perfused. No cyanosis. No clubbing. 

Cap refill <2 seconds. No peripheral edema.


LOWER EXTREMITIES: 2+ pulses, warm, well-perfused. No calf tenderness. No 

peripheral edema. 


NEUROLOGICAL:  Cranial nerves II-XII intact. Normal speech. Gait not observed. B

/L LE: sensation diminished R>L; RLE: motor 5/5 on hip/knee flexion/extension, 4

/5 on dorsiflexion and plantar flexion; LLE: motor 5/5 on hip/knee flexion/

extension and 4/5 on dorsiflexion and plantar flexion. DTRs +2


PSYCHIATRIC: Cooperative. Good eye contact. Appropriate mood and affect.


SKIN: Warm, dry, normal turgor, no rashes or lesions noted. 





 Laboratory Results - last 24 hr











  11/02/18 11/02/18 11/02/18





  05:30 05:30 05:30


 


WBC  5.4  


 


RBC  5.36 H  


 


Hgb  12.0  


 


Hct  38.7  


 


MCV  72.1 L  


 


MCH  22.3 L  


 


MCHC  30.9 L  


 


RDW  15.4  


 


Plt Count  255  


 


MPV  8.3  


 


Sodium   137 


 


Potassium   4.3 


 


Chloride   106 


 


Carbon Dioxide   23 


 


Anion Gap   8 


 


BUN   9 


 


Creatinine   0.6 


 


Creat Clearance w eGFR   > 60 


 


Random Glucose   119 H 


 


Calcium   9.0 


 


Phosphorus   2.3 L 


 


Magnesium   2.1 


 


Crossmatch    See Detail








Active Medications











Generic Name Dose Route Start Last Admin





  Trade Name Freq  PRN Reason Stop Dose Admin


 


Acetaminophen  650 mg  11/01/18 05:10  11/02/18 08:29





  Tylenol -  PO   650 mg





  Q6H PRN   Administration





  PAIN OR FEVER   





     





     





     


 


Diphenhydramine HCl  25 mg  11/01/18 15:30  11/01/18 18:03





  Benadryl -  PO  11/05/18 15:31  25 mg





  Q24H WILLIAM   Administration





     





     





     





     


 


Dextrose/Sodium Chloride  1,000 mls @ 75 mls/hr  10/31/18 05:45  11/02/18 08:30





  D5-1/2ns -  IV   75 mls/hr





  ASDIR WILLIAM   Administration





     





     





     





     


 


Immune Globulin  25 gm in 250 mls @ 38.102 mls/hr  11/02/18 22:00  





  Gamunex-C  IVPB  11/06/18 04:34  





  Q24H WILLIAM   





     





     





  Protocol   





  1 MG/KG/MIN   


 


Ibuprofen  600 mg  11/02/18 09:00  11/02/18 10:02





  Caldolor Injection -  IVPB   600 mg





  Q8H PRN   Administration





  FEVER   





     





     





     











ASSESSMENT/PLAN:


Patient is a 28 year old female with no past medical history presenting with 

worsening bilateral leg weakness, numbness and tingling since 2 weeks ago. LP 

done yesterday revealed cyto-albumin dissociation with protein at 300 range and 

WBC of 7. 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 

Transferred to ICU for closer monitoring.  





#Neurology


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


-LP done revealed cyto-albumin dissociation with protein at 300 range and WBC 

of 7


-Neurology (Dr. Burgess) consulted. Recommendations appreciated.


-IVIG q24h started 1/5 doses


-Benadryl prior to IVIG treatment


-For closer monitoring in the ICU


-IgA immunoglobulin ordered.


-H. pylori IgM, IgA, IgG Ab


-Neuro checks q4h


-Respiratory - FVC q4h


-Physical therapy.


-Pulmonology consulted. 





#FEN


-IV D5-1/2NS @75ml/hr


-electrolytes wnl, routine bmp monitoring


-regular diet





#Prophylaxis


-SCDs, both legs


-Early ambulation





#Disposition


-transfer to ICU for closer monitoring


-full code








Visit type





- Emergency Visit


Emergency Visit: Yes


ED Registration Date: 10/30/18


Care time: The patient presented to the Emergency Department on the above date 

and was hospitalized for further evaluation of their emergent condition.





- New Patient


This patient is new to me today: Yes


Date on this admission: 11/02/18





- Critical Care


Critical Care patient: Yes


Total Critical Care Time (in minutes): 40


Critical Care Statement: The care of this patient involved high complexity 

decision making to prevent further life threatening deterioration of the patient

's condition and/or to evaluate & treat vital organ system(s) failure or risk 

of failure.

## 2018-11-03 LAB
ANION GAP SERPL CALC-SCNC: 7 MMOL/L (ref 8–16)
BUN SERPL-MCNC: 10 MG/DL (ref 7–18)
CALCIUM SERPL-MCNC: 8.3 MG/DL (ref 8.5–10.1)
CHLORIDE SERPL-SCNC: 105 MMOL/L (ref 98–107)
CO2 SERPL-SCNC: 25 MMOL/L (ref 21–32)
CREAT SERPL-MCNC: 0.5 MG/DL (ref 0.55–1.3)
DEPRECATED RDW RBC AUTO: 15.3 % (ref 11.6–15.6)
GLUCOSE SERPL-MCNC: 98 MG/DL (ref 74–106)
H PYLORI IGM SER QL IA: <9 UNITS (ref 0–8.9)
HCT VFR BLD CALC: 34.8 % (ref 32.4–45.2)
HGB BLD-MCNC: 10.7 GM/DL (ref 10.7–15.3)
MAGNESIUM SERPL-MCNC: 2 MG/DL (ref 1.8–2.4)
MCH RBC QN AUTO: 22.2 PG (ref 25.7–33.7)
MCHC RBC AUTO-ENTMCNC: 30.6 G/DL (ref 32–36)
MCV RBC: 72.5 FL (ref 80–96)
PHOSPHATE SERPL-MCNC: 3.5 MG/DL (ref 2.5–4.9)
PLATELET # BLD AUTO: 237 K/MM3 (ref 134–434)
PMV BLD: 8.3 FL (ref 7.5–11.1)
POTASSIUM SERPLBLD-SCNC: 4 MMOL/L (ref 3.5–5.1)
RBC # BLD AUTO: 4.8 M/MM3 (ref 3.6–5.2)
SODIUM SERPL-SCNC: 138 MMOL/L (ref 136–145)
WBC # BLD AUTO: 4.7 K/MM3 (ref 4–10)

## 2018-11-03 RX ADMIN — IMMUNE GLOBULIN (HUMAN) SCH MLS/HR: 10 INJECTION INTRAVENOUS; SUBCUTANEOUS at 21:56

## 2018-11-03 RX ADMIN — DEXTROSE AND SODIUM CHLORIDE SCH MLS/HR: 5; 450 INJECTION, SOLUTION INTRAVENOUS at 12:52

## 2018-11-03 RX ADMIN — DIPHENHYDRAMINE HCL SCH MG: 25 CAPSULE ORAL at 21:12

## 2018-11-03 RX ADMIN — ACETAMINOPHEN PRN MG: 325 TABLET ORAL at 12:53

## 2018-11-03 RX ADMIN — POLYETHYLENE GLYCOL 3350 SCH: 17 POWDER, FOR SOLUTION ORAL at 10:15

## 2018-11-03 RX ADMIN — ACETAMINOPHEN PRN MG: 325 TABLET ORAL at 17:53

## 2018-11-03 NOTE — PN
Progress Note, Physician





- Current Medication List


Current Medications: 


Active Medications





Acetaminophen (Tylenol -)  650 mg PO Q6H PRN


   PRN Reason: PAIN OR FEVER


   Last Admin: 11/02/18 08:29 Dose:  650 mg


Diphenhydramine HCl (Benadryl -)  25 mg PO DAILY@2100 WILLIAM


   Stop: 11/05/18 21:01


   Last Admin: 11/02/18 21:25 Dose:  25 mg


Dextrose/Sodium Chloride (D5-1/2ns -)  1,000 mls @ 75 mls/hr IV ASDIR WILLIAM


   Last Admin: 11/02/18 08:30 Dose:  75 mls/hr


Immune Globulin (Gamunex-C)  25 gm in 250 mls @ 38.102 mls/hr IVPB Q24H WILLIAM; 

Protocol


   Stop: 11/06/18 04:34


   Last Admin: 11/02/18 21:28 Dose:  38.102 mls/hr


Ibuprofen (Caldolor Injection -)  600 mg IVPB Q8H PRN


   PRN Reason: FEVER


   Last Admin: 11/02/18 10:02 Dose:  600 mg


Polyethylene Glycol (Miralax (For Daily Use) -)  17 gm PO DAILY [facility]


   Last Admin: 11/02/18 17:21 Dose:  Not Given











- Objective


Vital Signs: 


 Vital Signs











Temperature  98.5 F   11/03/18 06:00


 


Pulse Rate  74   11/03/18 08:00


 


Respiratory Rate  20   11/03/18 08:00


 


Blood Pressure  121/84   11/03/18 08:00


 


O2 Sat by Pulse Oximetry (%)  98   11/02/18 20:33











Cardiovascular: Yes: Regular Rate and Rhythm


Respiratory: Yes: Regular, CTA Bilaterally


Gastrointestinal: Yes: Normal Bowel Sounds, Soft


Neurological: Yes: Alert, Oriented, Numbness, Paresthesia, Weakness


Labs: 


 CBC, BMP





 11/03/18 05:30 





 11/03/18 05:30 





 INR, PTT











INR  1.00  (0.83-1.09)   10/30/18  19:46    














Problem List





- Problems


(1) Guillain Barr syndrome


Assessment/Plan: 





-LP done revealed cyto-albumin dissociation with protein at 300 range and WBC 

of 7


-Neurology (Dr. Burgess) consulted. Recommendations appreciated.


-IVIG q24h started --5 doses


-Benadryl prior to IVIG treatment


-Neuro checks q4h


-Respiratory - FVC q4h


-Physical therapy.





Code(s): G61.0 - GUILLAIN-BARRE SYNDROME

## 2018-11-03 NOTE — PN
Progress Note (short form)





- Note


Progress Note: 





Pulm/CC


SUBJECTIVE:


Patient seen an d examined in the ICU. 





24Hr;


relates improved sensation in LE, less tingling, walking without difficulty, s/

p IVIG








 Vital Signs











Temp  98.2 F   11/03/18 18:00


 


Pulse  96 H  11/03/18 18:00


 


Resp  18   11/03/18 18:00


 


BP  125/81   11/03/18 18:00


 


Pulse Ox  98   11/03/18 10:00








 Intake & Output











 11/02/18 11/03/18 11/03/18





 23:59 11:59 23:59


 


Intake Total 1375 2300 900


 


Balance 1375 2300 900


 


Weight  63.645 kg 


 


Intake:   


 


   1800 900


 


    D5-1/2Ns - 1,000 ml @ 75 825 1800 900





    mls/hr IV ASDIR WILLIAM Rx#:   





    [id number]   


 


  IVPB 150 500 


 


  Oral 400  


 


Other:   


 


  Voiding Method Toilet Toilet 


 


  # Unmeasured Voids   


 


    Void 1 2 4


 


  Bowel Movement No No No


 


  Weight Measurement Method  Built in [facility] 











 


 Current Medications





Acetaminophen (Tylenol -)  650 mg PO Q6H PRN


   PRN Reason: PAIN OR FEVER


   Last Admin: 11/03/18 17:53 Dose:  650 mg


Diphenhydramine HCl (Benadryl -)  25 mg PO DAILY@2100 WILLIAM


   Stop: 11/05/18 21:01


   Last Admin: 11/02/18 21:25 Dose:  25 mg


Dextrose/Sodium Chloride (D5-1/2ns -)  1,000 mls @ 75 mls/hr IV ASDIR WILLIAM


   Last Admin: 11/03/18 12:52 Dose:  75 mls/hr


Immune Globulin (Gamunex-C)  25 gm in 250 mls @ 38.102 mls/hr IVPB Q24H [facility]; 

Protocol


   Stop: 11/06/18 04:34


   Last Admin: 11/02/18 21:28 Dose:  38.102 mls/hr


Ibuprofen (Caldolor Injection -)  600 mg IVPB Q8H PRN


   PRN Reason: FEVER


   Last Admin: 11/02/18 10:02 Dose:  600 mg


Polyethylene Glycol (Miralax (For Daily Use) -)  17 gm PO DAILY [facility]


   Last Admin: 11/03/18 10:15 Dose:  Not Given











GENERAL: Awake, alert, and fully oriented, in no acute distress. 


HEAD: Normal with no signs of trauma.


EYES: sclera anicteric, conjunctiva clear.


EARS, NOSE, THROAT: Ears normal, nares patent, oropharynx clear without 

exudates. Moist mucous membranes.


NECK: Normal range of motion, supple without lymphadenopathy, JVD, or masses.


LUNGS: Breath sounds equal, clear to auscultation bilaterally. No wheezes, and 

no crackles. No accessory muscle use.


HEART: Regular rate and rhythm, normal S1 and S2 without murmur, rub or gallop.


ABDOMEN: Soft, nontender, not distended, normoactive bowel sounds.


MUSCULOSKELETAL: Normal range of motion at all joints. No bony deformities or 

tenderness. 


UPPER EXTREMITIES: 2+ pulses, warm, well-perfused. No cyanosis. No clubbing. 

Cap refill <2 seconds. No peripheral edema.


LOWER EXTREMITIES: 2+ pulses, warm, well-perfused. No calf tenderness. No 

peripheral edema. 


NEUROLOGICAL: Non-focal, sensation bilateral LE is equivocal 


PSYCHIATRIC: Cooperative. Good eye contact. Appropriate mood and affect.


SKIN: Warm, dry, normal turgor, no rashes or lesions noted. 











  


  CBC, BMP





 11/03/18 05:30 





 11/03/18 05:30 








ASSESSMENT/PLAN:


Guillain-[place] syndrome (appears early but progressing symptoms) 











IVIG daily infusion 


Monitor FVC (stable at 3.4 L today) 


Monitor for signs of dysautonomia 


O2 as needed


Fall precautions 


Neuro checks 


Ok for floor today








Catherine ACNP


[id number]

## 2018-11-04 RX ADMIN — POLYETHYLENE GLYCOL 3350 SCH: 17 POWDER, FOR SOLUTION ORAL at 11:47

## 2018-11-04 RX ADMIN — IMMUNE GLOBULIN (HUMAN) SCH MLS/HR: 10 INJECTION INTRAVENOUS; SUBCUTANEOUS at 22:00

## 2018-11-04 RX ADMIN — DIPHENHYDRAMINE HCL SCH MG: 25 CAPSULE ORAL at 21:00

## 2018-11-04 NOTE — PN
Progress Note, Physician





- Current Medication List


Current Medications: 


Active Medications





Acetaminophen (Tylenol -)  650 mg PO Q6H PRN


   PRN Reason: PAIN OR FEVER


   Last Admin: 11/03/18 17:53 Dose:  650 mg


Diphenhydramine HCl (Benadryl -)  25 mg PO DAILY@2100 WILLIAM


   Stop: 11/05/18 21:01


   Last Admin: 11/03/18 21:12 Dose:  25 mg


Immune Globulin (Gamunex-C)  20 gm in 200 mls @ 38.102 mls/hr IVPB Q24H WILLIAM; 

Protocol


   Stop: 11/06/18 03:15


Ibuprofen (Caldolor Injection -)  600 mg IVPB Q8H PRN


   PRN Reason: FEVER


   Last Admin: 11/02/18 10:02 Dose:  600 mg


Polyethylene Glycol (Miralax (For Daily Use) -)  17 gm PO DAILY [facility]


   Last Admin: 11/03/18 10:15 Dose:  Not Given











- Objective


Vital Signs: 


 Vital Signs











Temperature  98.2 F   11/04/18 06:00


 


Pulse Rate  84   11/04/18 07:19


 


Respiratory Rate  17   11/04/18 07:19


 


Blood Pressure  119/87   11/04/18 07:19


 


O2 Sat by Pulse Oximetry (%)  98   11/04/18 07:19











Cardiovascular: Yes: Regular Rate and Rhythm, S1, S2


Respiratory: Yes: Regular, CTA Bilaterally


Gastrointestinal: Yes: Normal Bowel Sounds, Soft


Neurological: Yes: Numbness, Paresthesia, Unsteady Gait


Labs: 


 CBC, BMP





 11/03/18 05:30 





 11/03/18 05:30 





 INR, PTT











INR  1.00  (0.83-1.09)   10/30/18  19:46    














Problem List





- Problems


(1) Guillain Barr syndrome


Assessment/Plan: 





-LP done revealed cyto-albumin dissociation with protein at 300 range and WBC 

of 7


-Neurology (Dr. Burgess) consulted. Recommendations appreciated.


-IVIG q24h started --5 doses


-Benadryl prior to IVIG treatment


-Neuro checks q4h


-Respiratory - FVC q4h


-Physical therapy.


-lyme ab


-id consult


Code(s): G61.0 - GUILLAIN-BARRE SYNDROME

## 2018-11-04 NOTE — PN
Progress Note (short form)





- Note


Progress Note: 





Day #3 of IVIG. Reports she already feels better, has markedly less bilat LE 

numbness and paresthesias, mild ataxia present. 


Exam: gait- still high steppage, ankle jerks 2+





A/P:


Tolerating IVIG, will cont for another 2 days, cont. oral, vigorous hydration.





Trenton Burgess MD

## 2018-11-05 LAB
ALBUMIN SERPL-MCNC: 3.4 G/DL (ref 3.4–5)
ALP SERPL-CCNC: 57 U/L (ref 45–117)
ALT SERPL-CCNC: 22 U/L (ref 13–61)
ANION GAP SERPL CALC-SCNC: 6 MMOL/L (ref 8–16)
AST SERPL-CCNC: 17 U/L (ref 15–37)
BASOPHILS # BLD: 0.5 % (ref 0–2)
BILIRUB SERPL-MCNC: 0.3 MG/DL (ref 0.2–1)
BUN SERPL-MCNC: 11 MG/DL (ref 7–18)
CALCIUM SERPL-MCNC: 8.3 MG/DL (ref 8.5–10.1)
CHLORIDE SERPL-SCNC: 105 MMOL/L (ref 98–107)
CO2 SERPL-SCNC: 26 MMOL/L (ref 21–32)
CREAT SERPL-MCNC: 0.5 MG/DL (ref 0.55–1.3)
DEPRECATED RDW RBC AUTO: 14.8 % (ref 11.6–15.6)
EOSINOPHIL # BLD: 4 % (ref 0–4.5)
GLUCOSE SERPL-MCNC: 82 MG/DL (ref 74–106)
HCT VFR BLD CALC: 33.7 % (ref 32.4–45.2)
HGB BLD-MCNC: 11.4 GM/DL (ref 10.7–15.3)
LYMPHOCYTES # BLD: 25.1 % (ref 8–40)
MAGNESIUM SERPL-MCNC: 2.1 MG/DL (ref 1.8–2.4)
MCH RBC QN AUTO: 23.9 PG (ref 25.7–33.7)
MCHC RBC AUTO-ENTMCNC: 33.8 G/DL (ref 32–36)
MCV RBC: 70.9 FL (ref 80–96)
MONOCYTES # BLD AUTO: 8.4 % (ref 3.8–10.2)
NEUTROPHILS # BLD: 62 % (ref 42.8–82.8)
PHOSPHATE SERPL-MCNC: 4.2 MG/DL (ref 2.5–4.9)
PLATELET # BLD AUTO: 222 K/MM3 (ref 134–434)
PMV BLD: 8.2 FL (ref 7.5–11.1)
POTASSIUM SERPLBLD-SCNC: 4.3 MMOL/L (ref 3.5–5.1)
PROT SERPL-MCNC: 8.4 G/DL (ref 6.4–8.2)
RBC # BLD AUTO: 4.75 M/MM3 (ref 3.6–5.2)
SODIUM SERPL-SCNC: 137 MMOL/L (ref 136–145)
WBC # BLD AUTO: 5 K/MM3 (ref 4–10)

## 2018-11-05 RX ADMIN — POLYETHYLENE GLYCOL 3350 SCH: 17 POWDER, FOR SOLUTION ORAL at 11:25

## 2018-11-05 RX ADMIN — IMMUNE GLOBULIN (HUMAN) SCH MLS/HR: 10 INJECTION INTRAVENOUS; SUBCUTANEOUS at 22:00

## 2018-11-05 NOTE — PN
Progress Note, Physician


Chief Complaint: 





AWAKE ALERT FAMILY BEDSIDE


FEELING BETTER








- Current Medication List


Current Medications: 


Active Medications





Acetaminophen (Tylenol -)  650 mg PO Q6H PRN


   PRN Reason: PAIN OR FEVER


   Last Admin: 11/03/18 17:53 Dose:  650 mg


Diphenhydramine HCl (Benadryl -)  25 mg PO DAILY@2100 [facility]


   Stop: 11/05/18 21:01


   Last Admin: 11/04/18 21:00 Dose:  25 mg


Immune Globulin (Gamunex-C)  20 gm in 200 mls @ 38.102 mls/hr IVPB Q24H WILLIAM; 

Protocol


   Stop: 11/06/18 03:15


   Last Admin: 11/04/18 22:00 Dose:  38.102 mls/hr


Ibuprofen (Caldolor Injection -)  600 mg IVPB Q8H PRN


   PRN Reason: FEVER


   Last Admin: 11/02/18 10:02 Dose:  600 mg


Polyethylene Glycol (Miralax (For Daily Use) -)  17 gm PO DAILY [facility]


   Last Admin: 11/04/18 11:47 Dose:  Not Given











- Objective


Vital Signs: 


 Vital Signs











Temperature  98.4 F   11/05/18 06:00


 


Pulse Rate  80   11/05/18 06:00


 


Respiratory Rate  20   11/05/18 06:00


 


Blood Pressure  110/70   11/05/18 06:00


 


O2 Sat by Pulse Oximetry (%)  97   11/04/18 19:36











Constitutional: Yes: Mild Distress


Eyes: Yes: WNL


HENT: Yes: WNL


Neck: Yes: WNL


Cardiovascular: Yes: WNL


Respiratory: Yes: WNL


Gastrointestinal: Yes: WNL


Genitourinary: Yes: WNL


Musculoskeletal: Yes: Muscle Weakness


Extremities: Yes: WNL


Edema: No


Peripheral Pulses WNL: Yes


Integumentary: Yes: WNL


Wound/Incision: Yes: Clean/Dry


Neurological: Yes: Weakness


...Motor Strength: LLE, RLE


Psychiatric: Yes: WNL


Labs: 


 CBC, BMP





 11/05/18 05:30 





 11/05/18 05:30 





 INR, PTT











INR  1.00  (0.83-1.09)   10/30/18  19:46    














Problem List





- Problems


(1) Ataxia of both legs


Code(s): R27.0 - ATAXIA, UNSPECIFIED   





(2) Back pain


Code(s): M54.9 - DORSALGIA, UNSPECIFIED   





(3) DVT prophylaxis


Code(s): KNB4451 -    





(4) Guillain Barr syndrome


Code(s): G61.0 - GUILLAIN-BARRE SYNDROME   





(5) Lower extremity weakness


Code(s): R29.898 - OTH SYMPTOMS AND SIGNS INVOLVING THE MUSCULOSKELETAL SYSTEM 

  





Assessment/Plan





CONTINUE TREATMENT FOR GB


ON IVIG


NEURO EVAL APPRECIATED


DVT PROPHYLAXIS


PT/SNF


DC PLANNING IN AM

## 2018-11-05 NOTE — PN
Progress Note, Physician


History of Present Illness: 








doing well, numbness in legs improving, 


no SOB 


DAy #4 /5 IVIG 

















- Current Medication List


Current Medications: 


Active Medications





Acetaminophen (Tylenol -)  650 mg PO Q6H PRN


   PRN Reason: PAIN OR FEVER


   Last Admin: 11/03/18 17:53 Dose:  650 mg


Diphenhydramine HCl (Benadryl -)  25 mg PO DAILY@2100 WILLIAM


   Stop: 11/05/18 21:01


   Last Admin: 11/04/18 21:00 Dose:  25 mg


Immune Globulin (Gamunex-C)  20 gm in 200 mls @ 38.102 mls/hr IVPB Q24H [facility]; 

Protocol


   Stop: 11/06/18 03:15


   Last Admin: 11/04/18 22:00 Dose:  38.102 mls/hr


Ibuprofen (Caldolor Injection -)  600 mg IVPB Q8H PRN


   PRN Reason: FEVER


   Last Admin: 11/02/18 10:02 Dose:  600 mg


Polyethylene Glycol (Miralax (For Daily Use) -)  17 gm PO DAILY [facility]


   Last Admin: 11/04/18 11:47 Dose:  Not Given











- Objective


Vital Signs: 


 Vital Signs











Temperature  98.4 F   11/05/18 06:00


 


Pulse Rate  80   11/05/18 06:00


 


Respiratory Rate  20   11/05/18 06:00


 


Blood Pressure  110/70   11/05/18 06:00


 


O2 Sat by Pulse Oximetry (%)  97   11/05/18 08:00











Constitutional: Yes: Well Nourished


Labs: 


 CBC, BMP





 11/05/18 05:30 





 11/05/18 05:30 





 INR, PTT











INR  1.00  (0.83-1.09)   10/30/18  19:46    














Assessment/Plan


GBS -- inflammatory demyelinating neuropathy





day #4/5 IVIG -doing well 


should be stable for dc in AM, 


rehab vs home TBD 








DR BAER

## 2018-11-05 NOTE — PN
Physical Exam: 


SUBJECTIVE: Patient seen and examined at bedside. Patient reports improvement 

of leg weakness and numbness. But still reports decreased sensation on 

bilateral LE. Complete 4/5 of IVIG treatment. Patient denies chest pain, SOB, 

palpitations, abdominal pain, diarrhea, urinary symptoms. 








OBJECTIVE:





 Vital Signs











 Period  Temp  Pulse  Resp  BP Sys/Wu  Pulse Ox


 


 Last 24 Hr  98.2 F-98.9 F  78-88  16-20  108-117/54-70  97-97








GENERAL: Awake, alert, and fully oriented, in no acute distress.


HEAD: Normal with no signs of trauma.


EYES: PERRLA, EOMI, sclera anicteric, conjunctiva clear.


EARS, NOSE, THROAT: Ears normal, nares patent, oropharynx clear without 

exudates. Moist mucous membranes.


NECK: Normal range of motion, supple without lymphadenopathy, JVD, or masses.


LUNGS: Breath sounds equal, clear to auscultation bilaterally. No wheezes, and 

no crackles. No accessory muscle use.


HEART: Regular rate and rhythm, normal S1 and S2 without murmur, rub or gallop.


ABDOMEN: Soft, nontender, not distended, normoactive bowel sounds.


MUSCULOSKELETAL: Normal range of motion at all joints. No bony deformities or 

tenderness. 


UPPER EXTREMITIES: 2+ pulses, warm, well-perfused. No cyanosis. No clubbing. 

Cap refill <2 seconds. No peripheral edema.


LOWER EXTREMITIES: 2+ pulses, warm, well-perfused. No calf tenderness. No 

peripheral edema. 


NEUROLOGICAL:  Cranial nerves II-XII intact. Normal speech. High steppage gait. 

B/L LE: sensation diminished R>L; RLE: motor 5/5 on hip/knee flexion/extension, 

5/5 on dorsiflexion and plantar flexion; LLE: motor 5/5 on hip/knee flexion/

extension and 5/5 on dorsiflexion and plantar flexion. DTRs +2


PSYCHIATRIC: Cooperative. Good eye contact. Appropriate mood and affect.


SKIN: Warm, dry, normal turgor, no rashes or lesions noted. 





 Laboratory Results - last 24 hr











  11/05/18 11/05/18





  05:30 05:30


 


WBC  5.0 


 


RBC  4.75 


 


Hgb  11.4 


 


Hct  33.7 


 


MCV  70.9 L 


 


MCH  23.9 L 


 


MCHC  33.8 


 


RDW  14.8 


 


Plt Count  222 


 


MPV  8.2 


 


Absolute Neuts (auto)  3.1 


 


Neutrophils %  62.0  D 


 


Lymphocytes %  25.1  D 


 


Monocytes %  8.4 


 


Eosinophils %  4.0 


 


Basophils %  0.5 


 


Nucleated RBC %  0 


 


Sodium   137


 


Potassium   4.3


 


Chloride   105


 


Carbon Dioxide   26


 


Anion Gap   6 L


 


BUN   11


 


Creatinine   0.5 L


 


Creat Clearance w eGFR   > 60


 


Random Glucose   82


 


Calcium   8.3 L


 


Phosphorus   4.2


 


Magnesium   2.1


 


Total Bilirubin   0.3


 


AST   17


 


ALT   22


 


Alkaline Phosphatase   57


 


Total Protein   8.4 H


 


Albumin   3.4








Active Medications











Generic Name Dose Route Start Last Admin





  Trade Name Rommelq  PRN Reason Stop Dose Admin


 


Acetaminophen  650 mg  11/01/18 05:10  11/03/18 17:53





  Tylenol -  PO   650 mg





  Q6H PRN   Administration





  PAIN OR FEVER   





     





     





     


 


Diphenhydramine HCl  25 mg  11/02/18 21:00  11/04/18 21:00





  Benadryl -  PO  11/05/18 21:01  25 mg





  DAILY@2100 WILLIAM   Administration





     





     





     





     


 


Immune Globulin  20 gm in 200 mls @ 38.102 mls/hr  11/04/18 22:00  11/04/18 22:

00





  Gamunex-C  IVPB  11/06/18 03:15  38.102 mls/hr





  Q24H WILLIAM   Administration





     





     





  Protocol   





  1 MG/KG/MIN   


 


Ibuprofen  600 mg  11/02/18 09:00  11/02/18 10:02





  Caldolor Injection -  IVPB   600 mg





  Q8H PRN   Administration





  FEVER   





     





     





     


 


Polyethylene Glycol  17 gm  11/02/18 10:00  11/05/18 11:25





  Miralax (For Daily Use) -  PO   Not Given





  DAILY WILLIAM   





     





     





     





     











ASSESSMENT/PLAN:


Patient is a 28 year old female with no past medical history presenting with 

worsening bilateral leg weakness, numbness and tingling since 2 weeks ago. LP 

done yesterday revealed cyto-albumin dissociation with protein at 300 range and 

WBC of 7. 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 

Transferred to ICU for closer monitoring.  





#Neurology


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


-LP done revealed cyto-albumin dissociation with protein at 300 range and WBC 

of 7


-Neurology (Dr. Burgess) consulted. Recommendations appreciated.


-IVIG q24h started 4/5 doses


-Benadryl prior to IVIG treatment


-IgA immunoglobulin ordered.


-H. pylori IgM, IgA, IgG Ab


-Neuro checks q4h


-Respiratory - FVC q4h - stable at 3L


-Physical therapy.


-Pulmonology consulted. 





#FEN


-IV D5-1/2NS @75ml/hr


-electrolytes wnl, routine bmp monitoring


-regular diet





#Prophylaxis


-SCDs, both legs


-Early ambulation





#[facility]


-full code





Visit type





- Emergency Visit


Emergency Visit: Yes


ED Registration Date: 10/30/18


Care time: The patient presented to the Emergency Department on the above date 

and was hospitalized for further evaluation of their emergent condition.





- New Patient


This patient is new to me today: Yes


Date on this admission: 11/05/18





- Critical Care


Critical Care patient: Yes


Total Critical Care Time (in minutes): 40


Critical Care Statement: The care of this patient involved high complexity 

decision making to prevent further life threatening deterioration of the patient

's condition and/or to evaluate & treat vital organ system(s) failure or risk 

of failure.

## 2018-11-05 NOTE — PN
Teaching Attending Note


Name of Resident: Carole Wright





ATTENDING PHYSICIAN STATEMENT





I saw and evaluated the patient.


I reviewed the resident's note and discussed the case with the resident.


I agree with the resident's findings and plan as documented.








SUBJECTIVE:


Patient seen and examined in the ICU.  Awake and alert.  Tolerating IVIG 

therapy. 


Still feels some sensory impairment in her RLE. 


Motor function seems normal. 





VC: 3.o Liters (3.4 on admission). 





No CP or SOB. 


 Intake & Output











 11/03/18 11/04/18 11/04/18 11/05/18





 00:59 00:59 23:59 23:59


 


Intake Total    300


 


Balance    300


 


Weight    140 lb 5 oz








 Last Vital Signs











Temp Pulse Resp BP Pulse Ox


 


 98.4 F   80   20   110/70   97 


 


 11/05/18 06:00  11/05/18 06:00  11/05/18 06:00  11/05/18 06:00  11/05/18 08:00








Active Medications





Acetaminophen (Tylenol -)  650 mg PO Q6H PRN


   PRN Reason: PAIN OR FEVER


   Last Admin: 11/03/18 17:53 Dose:  650 mg


Diphenhydramine HCl (Benadryl -)  25 mg PO DAILY@2100 WILLIAM


   Stop: 11/05/18 21:01


   Last Admin: 11/04/18 21:00 Dose:  25 mg


Immune Globulin (Gamunex-C)  20 gm in 200 mls @ 38.102 mls/hr IVPB Q24H WILLIAM; 

Protocol


   Stop: 11/06/18 03:15


   Last Admin: 11/04/18 22:00 Dose:  38.102 mls/hr


Ibuprofen (Caldolor Injection -)  600 mg IVPB Q8H PRN


   PRN Reason: FEVER


   Last Admin: 11/02/18 10:02 Dose:  600 mg


Polyethylene Glycol (Miralax (For Daily Use) -)  17 gm PO DAILY WILLIAM


   Last Admin: 11/05/18 11:25 Dose:  Not Given











GENERAL: Awake, alert, and fully oriented, in no acute distress.


HEAD: Normal with no signs of trauma.


EYES: sclera anicteric, conjunctiva clear.


EARS, NOSE, THROAT: Ears normal, nares patent, oropharynx clear without 

exudates. Moist mucous membranes.


NECK: Normal range of motion, supple without lymphadenopathy, JVD, or masses.


LUNGS: Breath sounds equal, clear to auscultation bilaterally. No wheezes, and 

no crackles. No accessory muscle use.


HEART: Regular rate and rhythm, normal S1 and S2 without murmur, rub or gallop.


ABDOMEN: Soft, nontender, not distended, normoactive bowel sounds.


MUSCULOSKELETAL: Normal range of motion at all joints. No bony deformities or 

tenderness. 


UPPER EXTREMITIES: 2+ pulses, warm, well-perfused. No cyanosis. No clubbing. 

Cap refill <2 seconds. No peripheral edema.


LOWER EXTREMITIES: 2+ pulses, warm, well-perfused. No calf tenderness. No 

peripheral edema. 


NEUROLOGICAL: Non-focal, sensation slightly diminished R>L; Motor appears 

intact 


PSYCHIATRIC: Cooperative. Good eye contact. Appropriate mood and affect.


SKIN: Warm, dry, normal turgor, no rashes or lesions noted. 











  Laboratory Results - last 24 hr











  11/05/18 11/05/18





  05:30 05:30


 


WBC  5.0 


 


RBC  4.75 


 


Hgb  11.4 


 


Hct  33.7 


 


MCV  70.9 L 


 


MCH  23.9 L 


 


MCHC  33.8 


 


RDW  14.8 


 


Plt Count  222 


 


MPV  8.2 


 


Absolute Neuts (auto)  3.1 


 


Neutrophils %  62.0  D 


 


Lymphocytes %  25.1  D 


 


Monocytes %  8.4 


 


Eosinophils %  4.0 


 


Basophils %  0.5 


 


Nucleated RBC %  0 


 


Sodium   137


 


Potassium   4.3


 


Chloride   105


 


Carbon Dioxide   26


 


Anion Gap   6 L


 


BUN   11


 


Creatinine   0.5 L


 


Creat Clearance w eGFR   > 60


 


Random Glucose   82


 


Calcium   8.3 L


 


Phosphorus   4.2


 


Magnesium   2.1


 


Total Bilirubin   0.3


 


AST   17


 


ALT   22


 


Alkaline Phosphatase   57


 


Total Protein   8.4 H


 


Albumin   3.4














ASSESSMENT/PLAN:


Guillain-[place] syndrome (appears early but progressing symptoms) 


Questionable history of organophosphate exposure: no anticholinergic symptoms 

noted 








IVIG daily infusion 


Monitor FVC (stable at 3.0 L today) 


Monitor for signs of dysautonomia 


O2 as needed


Fall precautions 


Neuro checks 


Floor 





Dr Kauffman

## 2018-11-05 NOTE — CON.ID
Consult


Consult Specialty:: infectious disease


Reason for Consultation:: dr sears





- History of Present Illness


Chief Complaint: bilateral foot tingling and numbness for 2 weeks


History of Present Illness: 





admitted 10/30 and had spinal tap c/w guillan barre- now on IVIG with 

improvement





no recent immunizations


no travel


works as a pharm tech


4 yo with recent coxsackie- about 3 weeks ago- hand foot mouth


no diarrhea


no sore throat 


no fevers


no pets





recent stress- custody of child- lost 10 pounds





- History Source


History Provided By: Patient, Significant Other





- Past Medical History


...LMP: 10/23/18


...Pregnant: No





- Past Surgical History


Past Surgical History: Yes: None





- Alcohol/Substance Use


Hx Alcohol Use: No





- Smoking History


Smoking history: Never smoked





- Social History


Usual Living Arrangement: With Significant Other (and children)


ADL: Independent


Occupation: pharm tech


History of Recent Travel: No





Home Medications





- Allergies


Allergies/Adverse Reactions: 


 Allergies











Allergy/AdvReac Type Severity Reaction Status Date / Time


 


No Known Allergies Allergy   Unverified 10/30/18 19:27














- Home Medications


Home Medications: 


Ambulatory Orders





NK [No Known Home Medication]  10/31/18 











Family Disease History





- Family Disease History


Family History: Denies





Review of Systems





- Review of Systems


Constitutional: reports: Unintentional Wgt. Loss.  denies: Chills, Diaphoresis, 

Fever, Lethargy, Loss of Appetite


Eyes: reports: No Symptoms


HENT: reports: No Symptoms


Neck: reports: No Symptoms


Cardiovascular: reports: No Symptoms


Respiratory: reports: No Symptoms


Gastrointestinal: reports: No Symptoms


Genitourinary: reports: No Symptoms


Integumentary: reports: Rash (itchy rash with tiny dry sports on shoulder and 

leg- she thinks gnat bites, denies tick bites)





Physical Exam


Vital Signs: 


 Vital Signs











Temperature  98.4 F   11/05/18 06:00


 


Pulse Rate  80   11/05/18 06:00


 


Respiratory Rate  20   11/05/18 06:00


 


Blood Pressure  110/70   11/05/18 06:00


 


O2 Sat by Pulse Oximetry (%)  97   11/05/18 08:00











Constitutional: Yes: Well Nourished, No Distress, Calm


Eyes: Yes: Conjunctiva Clear


HENT: Yes: Atraumatic, Normocephalic


Neck: Yes: Supple, Trachea Midline


Cardiovascular: Yes: Regular Rate and Rhythm


Respiratory: Yes: Regular, CTA Bilaterally


Gastrointestinal: Yes: Normal Bowel Sounds, Soft


...Rectal Exam: Yes: Deferred


Edema: No


Peripheral Pulses WNL: Yes


Integumentary: Yes: Other (dry tiney lesions on her shoulder and leg- about 4 

or 5 tiny scabs)


Neurological: Yes: Alert, Oriented


Labs: 


 CBC, BMP





 11/05/18 05:30 





 11/05/18 05:30 











Problem List





- Problems


(1) Guillain Barr syndrome


Code(s): G61.0 - GUILLAIN-BARRE SYNDROME   





Assessment/Plan





management per neurology


agreeable to hiv testing- have ordered, no diarrheal disease to suggest 

campylobacter


?viral- child with recent coxsackie illness


rash on shoulder and leg not c/w lyme 


plese call back if needed

## 2018-11-06 VITALS — HEART RATE: 93 BPM | SYSTOLIC BLOOD PRESSURE: 131 MMHG | DIASTOLIC BLOOD PRESSURE: 73 MMHG

## 2018-11-06 VITALS — TEMPERATURE: 98.3 F

## 2018-11-06 LAB
ANION GAP SERPL CALC-SCNC: 7 MMOL/L (ref 8–16)
BUN SERPL-MCNC: 9 MG/DL (ref 7–18)
CALCIUM SERPL-MCNC: 8.7 MG/DL (ref 8.5–10.1)
CHLORIDE SERPL-SCNC: 105 MMOL/L (ref 98–107)
CO2 SERPL-SCNC: 24 MMOL/L (ref 21–32)
CREAT SERPL-MCNC: 0.5 MG/DL (ref 0.55–1.3)
DEPRECATED RDW RBC AUTO: 14.5 % (ref 11.6–15.6)
GLUCOSE SERPL-MCNC: 86 MG/DL (ref 74–106)
HCT VFR BLD CALC: 35.4 % (ref 32.4–45.2)
HGB BLD-MCNC: 11.2 GM/DL (ref 10.7–15.3)
MAGNESIUM SERPL-MCNC: 2.2 MG/DL (ref 1.8–2.4)
MCH RBC QN AUTO: 22.7 PG (ref 25.7–33.7)
MCHC RBC AUTO-ENTMCNC: 31.6 G/DL (ref 32–36)
MCV RBC: 72 FL (ref 80–96)
PHOSPHATE SERPL-MCNC: 3.5 MG/DL (ref 2.5–4.9)
PLATELET # BLD AUTO: 221 K/MM3 (ref 134–434)
PMV BLD: 8.3 FL (ref 7.5–11.1)
POTASSIUM SERPLBLD-SCNC: 4.4 MMOL/L (ref 3.5–5.1)
RBC # BLD AUTO: 4.91 M/MM3 (ref 3.6–5.2)
SODIUM SERPL-SCNC: 136 MMOL/L (ref 136–145)
WBC # BLD AUTO: 4.9 K/MM3 (ref 4–10)

## 2018-11-06 NOTE — PN
Progress Note, Physician


History of Present Illness: 


doing well, day 5/5 IVIG 


no new issues 








- Current Medication List


Current Medications: 


Active Medications





Acetaminophen (Tylenol -)  650 mg PO Q6H PRN


   PRN Reason: PAIN OR FEVER


Ibuprofen (Caldolor Injection -)  600 mg IVPB Q8H PRN


   PRN Reason: FEVER


Polyethylene Glycol (Miralax (For Daily Use) -)  17 gm PO DAILY WILLIAM











- Objective


Vital Signs: 


 Vital Signs











Temperature  98.3 F   11/06/18 06:00


 


Pulse Rate  87   11/06/18 06:00


 


Respiratory Rate  18   11/06/18 06:00


 


Blood Pressure  111/61   11/06/18 06:00


 


O2 Sat by Pulse Oximetry (%)  97   11/05/18 20:38











Labs: 


 CBC, BMP





 11/06/18 07:10 





 11/06/18 07:10 





 INR, PTT











INR  1.00  (0.83-1.09)   10/30/18  19:46    














Problem List





- Problems


(1) Guillain Barr syndrome


Code(s): G61.0 - GUILLAIN-BARRE SYNDROME   





Assessment/Plan


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 


 stable for DC 


can FU in our [place] office within a week


[phone]





thanks 





DR BAER

## 2018-11-06 NOTE — DS
Physical Examination


Vital Signs: 


 Vital Signs











Temperature  98.3 F   11/06/18 06:00


 


Pulse Rate  87   11/06/18 06:00


 


Respiratory Rate  18   11/06/18 06:00


 


Blood Pressure  111/61   11/06/18 06:00


 


O2 Sat by Pulse Oximetry (%)  97   11/05/18 20:38











Cardiovascular: Yes: Regular Rate and Rhythm


Respiratory: Yes: Regular, CTA Bilaterally


Gastrointestinal: Yes: Normal Bowel Sounds, Soft.  No: Tenderness


Neurological: Yes: Alert, Oriented, Unsteady Gait (IMPROVING)


Labs: 


 CBC, BMP





 11/06/18 07:10 











Discharge Summary


Reason For Visit: GUILLAIN-BARRE SYNDROME


Current Active Problems





Ataxia of both legs (Acute)


Back pain (Acute)


DVT prophylaxis (Acute)


Guillain Barr syndrome (Acute)


Lower extremity weakness (Acute)








Hospital Course: 








admitted 10/30 and had spinal tap c/w guillan barre- now on IVIG with 

improvement





no recent immunizations


no travel


works as a pharm tech


4 yo with recent coxsackie- about 3 weeks ago- hand foot mouth


no diarrhea


no sore throat 


no fevers


no pets





recent stress- custody of child- lost 10 pounds





- History Source


History Provided By: Patient, Significant Other





- Past Medical History


...LMP: 10/23/18


...Pregnant: No





- Past Surgical History


Past Surgical History: Yes: None





- Alcohol/Substance Use


Hx Alcohol Use: No





- Smoking History


Smoking history: Never smoked





- Social History


Usual Living Arrangement: With Significant Other (and children)


ADL: Independent


Occupation: pharm tech


History of Recent Travel: No








- Problems


(1) Guillain Barr syndrome


Assessment/Plan: 





-LP done revealed cyto-albumin dissociation with protein at 300 range and WBC 

of 7


-Neurology (Dr. Burgess) consulted. Recommendations appreciated.


-IVIG q24h COMPLETED -5 doses


-Physical therapy.





Code(s): G61.0 - GUILLAIN-BARRE SYNDROME   








DC PLANNING WANTS TO GO HOME--STATES HAS SUPPORT


Condition: Improved





- Instructions


Referrals: 


Yunior Alanis MD [Primary Care Provider] - 


Disposition: VNS/HOME HEALTH CARE





- Home Medications


Comprehensive Discharge Medication List: 


Ambulatory Orders





Acetaminophen [Tylenol .Regular Strength -] 650 mg PO Q6H PRN  tablet 11/06/18 


Polyethylene Glycol 3350 [Miralax 119 gm Btl -] 17 gm PO DAILY  bottle 11/06/18

## 2018-11-06 NOTE — PN
Progress Note (short form)





- Note


Progress Note: 





PULMONARY





Denies shortness of breath, cough or chest discomfort. No fevers recorded. 

Still some right leg weakness. Wants to go home.





 Vital Signs











 Period  Temp  Pulse  Resp  BP Sys/Wu  Pulse Ox


 


 Last 24 Hr  98.2 F-98.4 F  75-88  18-19  111-136/61-78  97








Gen:  NAD at rest


Heart: RRR


Lung: decreased breath sounds at the bases


Abd: soft, nontender


Ext: no edema





 CBC, BMP





 11/06/18 07:10 





 11/06/18 07:10 





Active Medications





Acetaminophen (Tylenol -)  650 mg PO Q6H PRN


   PRN Reason: PAIN OR FEVER


Ibuprofen (Caldolor Injection -)  600 mg IVPB Q8H PRN


   PRN Reason: FEVER


Polyethylene Glycol (Miralax (For Daily Use) -)  17 gm PO DAILY WILLIAM





A/P


Guillain [place] Syndrome


s/p IVIG treatment





-  DVT prophylaxis


-  d/c planning